# Patient Record
Sex: FEMALE | Race: WHITE | NOT HISPANIC OR LATINO | Employment: OTHER | URBAN - NONMETROPOLITAN AREA
[De-identification: names, ages, dates, MRNs, and addresses within clinical notes are randomized per-mention and may not be internally consistent; named-entity substitution may affect disease eponyms.]

---

## 2021-04-15 PROBLEM — R06.09 DOE (DYSPNEA ON EXERTION): Status: ACTIVE | Noted: 2021-04-15

## 2021-04-15 PROBLEM — G43.909 MIGRAINES: Status: ACTIVE | Noted: 2021-04-15

## 2021-04-15 PROBLEM — I25.10 ATHEROSCLEROSIS OF LEFT ANTERIOR DESCENDING (LAD) ARTERY: Status: ACTIVE | Noted: 2021-04-15

## 2021-04-15 PROBLEM — E78.2 MIXED HYPERLIPIDEMIA: Status: ACTIVE | Noted: 2021-04-15

## 2021-04-15 PROBLEM — Z87.891 FORMER SMOKER: Status: ACTIVE | Noted: 2021-04-15

## 2021-04-15 PROBLEM — K21.9 GERD (GASTROESOPHAGEAL REFLUX DISEASE): Status: ACTIVE | Noted: 2021-04-15

## 2021-04-15 PROBLEM — D64.9 ANEMIA: Status: ACTIVE | Noted: 2021-04-15

## 2021-04-15 PROBLEM — E03.9 HYPOTHYROIDISM: Status: ACTIVE | Noted: 2021-04-15

## 2021-04-15 PROBLEM — Z99.89 OSA ON CPAP: Status: ACTIVE | Noted: 2021-04-15

## 2021-04-15 PROBLEM — D50.9 IRON DEFICIENCY ANEMIA: Status: ACTIVE | Noted: 2021-04-15

## 2021-04-15 PROBLEM — D64.9 ANEMIA: Status: RESOLVED | Noted: 2021-04-15 | Resolved: 2021-04-15

## 2021-04-15 PROBLEM — U07.1 COVID-19: Status: ACTIVE | Noted: 2021-04-15

## 2021-04-15 PROBLEM — G47.33 OSA ON CPAP: Status: ACTIVE | Noted: 2021-04-15

## 2021-04-15 RX ORDER — TRAZODONE HYDROCHLORIDE 150 MG/1
150 TABLET ORAL NIGHTLY
COMMUNITY

## 2021-04-15 RX ORDER — ARIPIPRAZOLE 10 MG/1
10 TABLET ORAL DAILY
COMMUNITY
End: 2022-05-10

## 2021-04-15 RX ORDER — LEVOTHYROXINE SODIUM 0.05 MG/1
50 TABLET ORAL DAILY
COMMUNITY

## 2021-04-15 RX ORDER — QUETIAPINE FUMARATE 300 MG/1
300 TABLET, FILM COATED ORAL NIGHTLY
COMMUNITY

## 2021-04-15 RX ORDER — CHOLECALCIFEROL (VITAMIN D3) 125 MCG
CAPSULE ORAL DAILY
COMMUNITY
End: 2021-04-19

## 2021-04-15 RX ORDER — ASPIRIN 81 MG/1
81 TABLET ORAL DAILY
COMMUNITY

## 2021-04-15 RX ORDER — SODIUM PHOSPHATE,MONO-DIBASIC 19G-7G/118
ENEMA (ML) RECTAL DAILY
COMMUNITY
End: 2021-04-19

## 2021-04-15 RX ORDER — ROSUVASTATIN CALCIUM 20 MG/1
20 TABLET, COATED ORAL DAILY
COMMUNITY

## 2021-04-15 RX ORDER — FUROSEMIDE 20 MG/1
20 TABLET ORAL DAILY
COMMUNITY
End: 2021-04-19

## 2021-04-15 RX ORDER — POTASSIUM CHLORIDE 750 MG/1
10 CAPSULE, EXTENDED RELEASE ORAL DAILY PRN
COMMUNITY

## 2021-04-15 RX ORDER — LANOLIN ALCOHOL/MO/W.PET/CERES
1000 CREAM (GRAM) TOPICAL DAILY
COMMUNITY

## 2021-04-15 RX ORDER — OMEPRAZOLE 20 MG/1
20 CAPSULE, DELAYED RELEASE ORAL DAILY
COMMUNITY

## 2021-04-15 RX ORDER — SUMATRIPTAN 20 MG/1
1 SPRAY NASAL
COMMUNITY

## 2021-04-15 NOTE — PATIENT INSTRUCTIONS
Obesity, Adult  Obesity is the condition of having too much total body fat. Being overweight or obese means that your weight is greater than what is considered healthy for your body size. Obesity is determined by a measurement called BMI. BMI is an estimate of body fat and is calculated from height and weight. For adults, a BMI of 30 or higher is considered obese.  Obesity can lead to other health concerns and major illnesses, including:  · Stroke.  · Coronary artery disease (CAD).  · Type 2 diabetes.  · Some types of cancer, including cancers of the colon, breast, uterus, and gallbladder.  · Osteoarthritis.  · High blood pressure (hypertension).  · High cholesterol.  · Sleep apnea.  · Gallbladder stones.  · Infertility problems.  What are the causes?  Common causes of this condition include:  · Eating daily meals that are high in calories, sugar, and fat.  · Being born with genes that may make you more likely to become obese.  · Having a medical condition that causes obesity, including:  ? Hypothyroidism.  ? Polycystic ovarian syndrome (PCOS).  ? Binge-eating disorder.  ? Cushing syndrome.  · Taking certain medicines, such as steroids, antidepressants, and seizure medicines.  · Not being physically active (sedentary lifestyle).  · Not getting enough sleep.  · Drinking high amounts of sugar-sweetened beverages, such as soft drinks.  What increases the risk?  The following factors may make you more likely to develop this condition:  · Having a family history of obesity.  · Being a woman of  descent.  · Being a man of  descent.  · Living in an area with limited access to:  ? [place], recreation centers, or sidewalks.  ? Healthy food choices, such as grocery stores and farmers' markets.  What are the signs or symptoms?  The main sign of this condition is having too much body fat.  How is this diagnosed?  This condition is diagnosed based on:  · Your BMI. If you are an adult with a BMI of 30 or  higher, you are considered obese.  · Your waist circumference. This measures the distance around your waistline.  · Your skinfold thickness. Your health care provider may gently pinch a fold of your skin and measure it.  You may have other tests to check for underlying conditions.  How is this treated?  Treatment for this condition often includes changing your lifestyle. Treatment may include some or all of the following:  · Dietary changes. This may include developing a healthy meal plan.  · Regular physical activity. This may include activity that causes your heart to beat faster (aerobic exercise) and strength training. Work with your health care provider to design an exercise program that works for you.  · Medicine to help you lose weight if you are unable to lose 1 pound a week after 6 weeks of healthy eating and more physical activity.  · Treating conditions that cause the obesity (underlying conditions).  · Surgery. Surgical options may include gastric banding and gastric bypass. Surgery may be done if:  ? Other treatments have not helped to improve your condition.  ? You have a BMI of 40 or higher.  ? You have life-threatening health problems related to obesity.  Follow these instructions at home:  Eating and drinking    · Follow recommendations from your health care provider about what you eat and drink. Your health care provider may advise you to:  ? Limit fast food, sweets, and processed snack foods.  ? Choose low-fat options, such as low-fat milk instead of whole milk.  ? Eat 5 or more servings of fruits or vegetables every day.  ? Eat at home more often. This gives you more control over what you eat.  ? Choose healthy foods when you eat out.  ? Learn to read food labels. This will help you understand how much food is considered 1 serving.  ? Learn what a healthy serving size is.  ? Keep low-fat snacks available.  ? Limit sugary drinks, such as soda, fruit juice, sweetened iced tea, and flavored  milk.  · Drink enough water to keep your urine pale yellow.  · Do not follow a fad diet. Fad diets can be unhealthy and even dangerous.  Physical activity  · Exercise regularly, as told by your health care provider.  ? Most adults should get up to 150 minutes of moderate-intensity exercise every week.  ? Ask your health care provider what types of exercise are safe for you and how often you should exercise.  · Warm up and stretch before being active.  · Cool down and stretch after being active.  · Rest between periods of activity.  Lifestyle  · Work with your health care provider and a dietitian to set a weight-loss goal that is healthy and reasonable for you.  · Limit your screen time.  · Find ways to reward yourself that do not involve food.  · Do not drink alcohol if:  ? Your health care provider tells you not to drink.  ? You are pregnant, may be pregnant, or are planning to become pregnant.  · If you drink alcohol:  ? Limit how much you use to:  § 0-1 drink a day for women.  § 0-2 drinks a day for men.  ? Be aware of how much alcohol is in your drink. In the [place] one drink equals one 12 oz bottle of beer (355 mL), one 5 oz glass of wine (148 mL), or one 1½ oz glass of hard liquor (44 mL).  General instructions  · Keep a weight-loss journal to keep track of the food you eat and how much exercise you get.  · Take over-the-counter and prescription medicines only as told by your health care provider.  · Take vitamins and supplements only as told by your health care provider.  · Consider joining a support group. Your health care provider may be able to recommend a support group.  · Keep all follow-up visits as told by your health care provider. This is important.  Contact a health care provider if:  · You are unable to meet your weight loss goal after 6 weeks of dietary and lifestyle changes.  Get help right away if you are having:  · Trouble breathing.  · Suicidal thoughts or behaviors.  Summary  · Obesity is the  condition of having too much total body fat.  · Being overweight or obese means that your weight is greater than what is considered healthy for your body size.  · Work with your health care provider and a dietitian to set a weight-loss goal that is healthy and reasonable for you.  · Exercise regularly, as told by your health care provider. Ask your health care provider what types of exercise are safe for you and how often you should exercise.  This information is not intended to replace advice given to you by your health care provider. Make sure you discuss any questions you have with your health care provider.  Document Revised: 08/22/2019 Document Reviewed: 08/22/2019  [employer] Patient Education © 2021 [employer]  MyPlate from [employer]    MyPlate is an outline of a general healthy diet based on the 2010 Dietary Guidelines for Americans, from the U.S. Department of Agriculture (USDA). It sets guidelines for how much food you should eat from each food group based on your age, sex, and level of physical activity.  What are tips for following MyPlate?  To follow MyPlate recommendations:  · Eat a wide variety of fruits and vegetables, grains, and protein foods.  · Serve smaller portions and eat less food throughout the day.  · Limit portion sizes to avoid overeating.  · Enjoy your food.  · Get at least 150 minutes of exercise every week. This is about 30 minutes each day, 5 or more days per week.  It can be difficult to have every meal look like MyPlate. Think about MyPlate as eating guidelines for an entire day, rather than each individual meal.  Fruits and vegetables  · Make half of your plate fruits and vegetables.  · Eat many different colors of fruits and vegetables each day.  · For a 2,000 calorie daily food plan, eat:  ? 2½ cups of vegetables every day.  ? 2 cups of fruit every day.  · 1 cup is equal to:  ? 1 cup raw or cooked vegetables.  ? 1 cup raw fruit.  ? 1 medium-sized orange, apple, or banana.  ? 1 cup  100% fruit or vegetable juice.  ? 2 cups raw leafy greens, such as lettuce, spinach, or kale.  ? ½ cup dried fruit.  Grains  · One fourth of your plate should be grains.  · Make at least half of the grains you eat each day whole grains.  · For a 2,000 calorie daily food plan, eat 6 oz of grains every day.  · 1 oz is equal to:  ? 1 slice bread.  ? 1 cup cereal.  ? ½ cup cooked rice, cereal, or pasta.  Protein  · One fourth of your plate should be protein.  · Eat a wide variety of protein foods, including meat, poultry, fish, eggs, beans, nuts, and tofu.  · For a 2,000 calorie daily food plan, eat 5½ oz of protein every day.  · 1 oz is equal to:  ? 1 oz meat, poultry, or fish.  ? ¼ cup cooked beans.  ? 1 egg.  ? ½ oz nuts or seeds.  ? 1 Tbsp peanut butter.  Dairy  · Drink fat-free or low-fat (1%) milk.  · Eat or drink dairy as a side to meals.  · For a 2,000 calorie daily food plan, eat or drink 3 cups of dairy every day.  · 1 cup is equal to:  ? 1 cup milk, yogurt, cottage cheese, or soy milk (soy beverage).  ? 2 oz processed cheese.  ? 1½ oz natural cheese.  Fats, oils, salt, and sugars  · Only small amounts of oils are recommended.  · Avoid foods that are high in calories and low in nutritional value (empty calories), like foods high in fat or added sugars.  · Choose foods that are low in salt (sodium). Choose foods that have less than 140 milligrams (mg) of sodium per serving.  · Drink water instead of sugary drinks. Drink enough water each day to keep your urine pale yellow.  Where to find support  · Work with your health care provider or a nutrition specialist (dietitian) to develop a customized eating plan that is right for you.  · Download an jd (mobile application) to help you track your daily food intake.  Where to find more information  · Go to ChooseMyPlate.gov for more information.  Summary  · MyPlate is a general guideline for healthy eating from the USDA. It is based on the 2010 Dietary Guidelines for  Americans.  · In general, fruits and vegetables should take up ½ of your plate, grains should take up ¼ of your plate, and protein should take up ¼ of your plate.  This information is not intended to replace advice given to you by your health care provider. Make sure you discuss any questions you have with your health care provider.  Document Revised: 05/21/2020 Document Reviewed: 03/19/2018  [employer] Patient Education © 2021 [employer]

## 2021-04-19 ENCOUNTER — OFFICE VISIT (OUTPATIENT)
Dept: CARDIOLOGY | Facility: CLINIC | Age: 61
End: 2021-04-19

## 2021-04-19 VITALS
WEIGHT: 209.2 LBS | BODY MASS INDEX: 35.71 KG/M2 | OXYGEN SATURATION: 98 % | DIASTOLIC BLOOD PRESSURE: 80 MMHG | HEART RATE: 66 BPM | SYSTOLIC BLOOD PRESSURE: 145 MMHG | HEIGHT: 64 IN

## 2021-04-19 DIAGNOSIS — G47.33 OSA ON CPAP: ICD-10-CM

## 2021-04-19 DIAGNOSIS — Z87.891 FORMER SMOKER: ICD-10-CM

## 2021-04-19 DIAGNOSIS — R06.09 DOE (DYSPNEA ON EXERTION): ICD-10-CM

## 2021-04-19 DIAGNOSIS — R01.1 HEART MURMUR: ICD-10-CM

## 2021-04-19 DIAGNOSIS — Z99.89 OSA ON CPAP: ICD-10-CM

## 2021-04-19 DIAGNOSIS — E78.2 MIXED HYPERLIPIDEMIA: ICD-10-CM

## 2021-04-19 DIAGNOSIS — I25.10 ATHEROSCLEROSIS OF LEFT ANTERIOR DESCENDING (LAD) ARTERY: Primary | ICD-10-CM

## 2021-04-19 PROCEDURE — 93000 ELECTROCARDIOGRAM COMPLETE: CPT | Performed by: INTERNAL MEDICINE

## 2021-04-19 PROCEDURE — 99204 OFFICE O/P NEW MOD 45 MIN: CPT | Performed by: INTERNAL MEDICINE

## 2021-04-19 RX ORDER — IBUPROFEN 800 MG/1
800 TABLET ORAL EVERY 8 HOURS PRN
COMMUNITY

## 2021-04-19 RX ORDER — TORSEMIDE 20 MG/1
TABLET ORAL DAILY PRN
COMMUNITY
Start: 2021-03-22

## 2021-04-19 RX ORDER — FERROUS SULFATE 325(65) MG
TABLET ORAL DAILY
COMMUNITY
Start: 2021-04-13 | End: 2021-06-21

## 2021-04-19 NOTE — PROGRESS NOTES
Subjective   Tonia Chandler is a 60 y.o. female     Chief Complaint   Patient presents with   • Establish Care     Here for eval.    • Coronary Artery Disease   • Shortness of Breath   • Fatigue       PROBLEM LIST:     1. LAD, severe calcification per LDCT scan on 4-   2. MARSHALL  3. Hyperlipidemia  4. QUAN, on CPAP. Followed By Dr. Zambrano  5. Migraines  6. Hypothyroidism  7. Former smoker  8. Iron Def. Anemia    Denies Rheumatic / Scarlet fever  Covid + end of July  Specialty Problems        Cardiology Problems    Atherosclerosis of left anterior descending (LAD) artery        Migraines        Mixed hyperlipidemia                HPI:    Ms. Tonia Chandler is a 60-year-old female patient of [facility] seen for evaluation of coronary disease.    Ms. Chandler is known to our practice but was last evaluated back in 2008.  At that time she presented with palpitations and chest discomfort atypical for angina and had an unremarkable work-up.  She is seen today because low-dose CT scanning demonstrated abundant calcification in the proximal LAD.    Ms. Chandler denies any type of chest discomfort.  She also denies orthopnea, PND, or lower extremity edema.  She no longer palpitates and she has very mild orthostatic lightheadedness but no ziggy dizziness, presyncope, or syncope.  She does not claudicate nor describe other symptoms of peripheral arterial disease, and she has no symptoms of arterial embolic events to include no symptoms of TIA or stroke.  For health reasons Ms. Chandler started dieting in January of this year and has lost 15 pounds.  She routinely functions at class I and II levels of activity without difficulty but states that she has intermittent shortness of breath with high levels of physical activity.  She does not describe a distinct threshold for shortness of breath.  She has no cough or wheeze.                      PRIOR MEDICATIONS    Current Outpatient Medications on File Prior  to Visit   Medication Sig Dispense Refill   • ARIPiprazole (ABILIFY) 10 MG tablet Take 10 mg by mouth Daily.     • Ascorbic Acid (VITAMIN C PO) Take  by mouth Daily.     • aspirin 325 MG tablet Take 325 mg by mouth Daily.     • FeroSul 325 (65 Fe) MG tablet Daily.     • furosemide (LASIX) 20 MG tablet Take 20 mg by mouth Daily.     • glucosamine-chondroitin 500-400 MG capsule capsule Take  by mouth Daily.     • ibuprofen (ADVIL,MOTRIN) 800 MG tablet Take 800 mg by mouth Every 8 (Eight) Hours As Needed for Mild Pain .     • levothyroxine (SYNTHROID, LEVOTHROID) 50 MCG tablet Take 50 mcg by mouth Daily.     • metoprolol tartrate (LOPRESSOR) 25 MG tablet Take 25 mg by mouth 2 (Two) Times a Day.     • Multiple Vitamins-Minerals (ZINC PO) Take  by mouth Daily.     • omeprazole (priLOSEC) 20 MG capsule Take 20 mg by mouth Daily.     • potassium chloride (MICRO-K) 10 MEQ CR capsule Take 10 mEq by mouth Daily As Needed.     • QUEtiapine (SEROquel) 300 MG tablet Take 300 mg by mouth Every Night.     • rosuvastatin (CRESTOR) 20 MG tablet Take 20 mg by mouth Daily.     • SUMAtriptan (IMITREX) 20 MG/ACT nasal spray 1 spray into the nostril(s) as directed by provider Every 2 (Two) Hours As Needed.     • torsemide (DEMADEX) 20 MG tablet Daily As Needed.     • traZODone (DESYREL) 150 MG tablet Take 150 mg by mouth Every Night.     • vitamin B-12 (CYANOCOBALAMIN) 1000 MCG tablet Take 1,000 mcg by mouth Daily.     • VITAMIN E PO Take  by mouth Daily.     • [DISCONTINUED] Calcium Carbonate (CALCIUM 500 PO) Take  by mouth 2 (two) times a day.     • [DISCONTINUED] Cholecalciferol (Vitamin D3) 50 MCG (2000 UT) tablet Take  by mouth Daily.       No current facility-administered medications on file prior to visit.       ALLERGIES:    Hydrocodone    PAST MEDICAL HISTORY:    Past Medical History:   Diagnosis Date   • Atherosclerosis of left anterior descending (LAD) artery    • MARSHALL (dyspnea on exertion)    • Former smoker    • GERD  (gastroesophageal reflux disease)    • Hyperlipidemia    • Hypothyroidism    • Iron deficiency anemia    • Migraines    • QUAN on CPAP     uses cpap       SURGICAL HISTORY:    Past Surgical History:   Procedure Laterality Date   • ENDOSCOPY AND COLONOSCOPY     • ORIF FEMUR FRACTURE       and knee       SOCIAL HISTORY:    Social History     Socioeconomic History   • Marital status:      Spouse name: Not on file   • Number of children: Not on file   • Years of education: Not on file   • Highest education level: Not on file   Tobacco Use   • Smoking status: Former Smoker     Types: Cigarettes   • Smokeless tobacco: Never Used   • Tobacco comment: quit 2.5 yrs. ago, using e-cigs.    Substance and Sexual Activity   • Alcohol use: Yes     Comment: occas. drink   • Drug use: Never       FAMILY HISTORY:    Family History   Problem Relation Age of Onset   • Diabetes Mother    • Hypertension Mother    • Coronary artery disease Mother    • Depression Father        Review of Systems   Constitutional: Positive for fatigue. Negative for chills, diaphoresis, fever and unexpected weight change.   HENT: Negative.    Eyes: Positive for visual disturbance (wears glasses).   Respiratory: Positive for apnea (HX QUAN uses CPAP) and shortness of breath (with stairs/inclines and exertion).         Denies orthopnea/PND   Cardiovascular: Negative.    Gastrointestinal: Positive for constipation (r/t FE PO). Negative for blood in stool (denies melena,hematuria,hemoptysis,hematochezia) and diarrhea.   Endocrine: Positive for heat intolerance. Negative for cold intolerance.   Genitourinary: Negative.    Musculoskeletal: Positive for arthralgias and myalgias.   Skin: Negative.    Allergic/Immunologic: Positive for environmental allergies.   Neurological: Positive for light-headedness (occas. with getting up quickly).   Hematological: Bruises/bleeds easily (bleed).   Psychiatric/Behavioral: Positive for sleep disturbance.       VISIT  "VITALS:  Vitals:    04/19/21 0927   BP: 145/80   BP Location: Left arm   Patient Position: Sitting   Pulse: 66   SpO2: 98%   Weight: 94.9 kg (209 lb 3.2 oz)   Height: 162.6 cm (64\")      /80 (BP Location: Left arm, Patient Position: Sitting)   Pulse 66   Ht 162.6 cm (64\")   Wt 94.9 kg (209 lb 3.2 oz)   SpO2 98%   BMI 35.91 kg/m²     RECENT LABS:    Objective       Physical Exam  Vitals and nursing note reviewed.   Constitutional:       General: She is not in acute distress.     Appearance: She is well-developed.   HENT:      Head: Normocephalic and atraumatic.   Eyes:      Conjunctiva/sclera: Conjunctivae normal.      Pupils: Pupils are equal, round, and reactive to light.      Comments: No ptosis or lid lag  Extra ocular motions intact  ZEKE   Neck:      Vascular: No carotid bruit, hepatojugular reflux or JVD.      Trachea: No tracheal deviation.      Comments: Nl. Carotid upstrokes  Cardiovascular:      Rate and Rhythm: Normal rate and regular rhythm.      Heart sounds: S1 normal and S2 normal. Murmur heard.   Systolic murmur is present.   Gallop present. S4 sounds present.       Comments: 2/6 TR  1-2/6 systolic ejection murmur RUSB  Pulmonary:      Effort: Pulmonary effort is normal.      Breath sounds: Normal breath sounds. No wheezing, rhonchi or rales.      Comments: Nl. Expir. Phase  Nl. Breath sound intensity    Abdominal:      General: Bowel sounds are normal. There is no distension or abdominal bruit.      Palpations: Abdomen is soft. There is no mass.      Tenderness: There is no abdominal tenderness. There is no guarding or rebound.      Comments: No organomegaly   Musculoskeletal:         General: No tenderness or deformity. Normal range of motion.      Cervical back: Normal range of motion and neck supple.      Comments: LLE, no edema, palpable pedal pulses  RLE, no edema, palpable pedal pulses   Skin:     General: Skin is warm and dry.      Coloration: Skin is not pale.      Findings: No " erythema or rash.   Neurological:      Mental Status: She is alert and oriented to person, place, and time.   Psychiatric:         Behavior: Behavior normal.         Thought Content: Thought content normal.         Judgment: Judgment normal.           ECG 12 Lead    Date/Time: 4/19/2021 9:57 AM  Performed by: Kaz Sen MD  Authorized by: Kaz Sen MD   Comparison: not compared with previous ECG   Previous ECG: no previous ECG available  Comments: Sinus bradycardia with sinus arrhythmia.  Intra-atrial conduction delay.              Assessment/Plan   #1.  Coronary artery calcification on CT scan of the chest.  We will schedule stress testing to assess the physiologic significance of atheroma identified at the time of CT scanning.  In the absence of symptoms I would defer the need for invasive evaluation except for high risk findings.    2.  Sleep apnea.  The patient is using CPAP as able.    3.  Exertional dyspnea.  The patient describes mild exertional dyspnea which is been stable over time.  If cardiac work-up is negative would consider pulmonary evaluation.    4.  Dyslipidemia by history.  The patient is tolerating high-dose statin.    5.  History of palpitations.  These are currently quiescent    6.  Valvular heart disease as described on physical exam today.  We will perform echocardiography to reassess valve anatomy and physiology and also to assess LV systolic and diastolic function and pulmonary pressures given the patient's exertional dyspnea.    7.  The patient will follow with on emergency and as instructed, and we will plan to see her in follow-up after testing or on a as needed basis as discussed.   Diagnosis Plan   1. Atherosclerosis of left anterior descending (LAD) artery     2. MARSHALL (dyspnea on exertion)     3. Mixed hyperlipidemia     4. QUAN on CPAP     5. Former smoker         No follow-ups on file.         Tonia Ramesh  reports that she has quit smoking. Her smoking use  included cigarettes. She has never used smokeless tobacco.. I have educated her on the risk of diseases from using tobacco products such as cancer, COPD and heart disease.     I advised her to quit and she is not willing to quit.    I spent 3  minutes counseling the patient.          Patient's Body mass index is 35.91 kg/m². BMI is above normal parameters. Recommendations include: educational material and referral to primary care.       Nancie Foley LPN    Scribed for Dr. Kaz Sen by Nancie Foley LPN April 19, 2021 09:43 EDT         Electronically signed by:            This note is dictated utilizing voice recognition software.  Although this record has been proof read, transcriptional errors may still be present. If questions occur regarding the content of this record please do not hesitate to call our office.

## 2021-06-04 ENCOUNTER — HOSPITAL ENCOUNTER (OUTPATIENT)
Dept: CARDIOLOGY | Facility: HOSPITAL | Age: 61
Discharge: HOME | End: 2021-06-04

## 2021-06-04 DIAGNOSIS — I25.10 ATHEROSCLEROSIS OF LEFT ANTERIOR DESCENDING (LAD) ARTERY: ICD-10-CM

## 2021-06-04 DIAGNOSIS — R01.1 HEART MURMUR: ICD-10-CM

## 2021-06-04 DIAGNOSIS — Z87.891 FORMER SMOKER: ICD-10-CM

## 2021-06-04 DIAGNOSIS — E78.2 MIXED HYPERLIPIDEMIA: ICD-10-CM

## 2021-06-04 DIAGNOSIS — R06.09 DOE (DYSPNEA ON EXERTION): ICD-10-CM

## 2021-06-04 PROCEDURE — A9500 TC99M SESTAMIBI: HCPCS | Performed by: INTERNAL MEDICINE

## 2021-06-04 PROCEDURE — 0 TECHNETIUM SESTAMIBI: Performed by: INTERNAL MEDICINE

## 2021-06-04 PROCEDURE — 78452 HT MUSCLE IMAGE SPECT MULT: CPT

## 2021-06-04 PROCEDURE — 25010000002 REGADENOSON 0.4 MG/5ML SOLUTION: Performed by: INTERNAL MEDICINE

## 2021-06-04 PROCEDURE — 93017 CV STRESS TEST TRACING ONLY: CPT

## 2021-06-04 PROCEDURE — 78452 HT MUSCLE IMAGE SPECT MULT: CPT | Performed by: INTERNAL MEDICINE

## 2021-06-04 PROCEDURE — 93306 TTE W/DOPPLER COMPLETE: CPT | Performed by: INTERNAL MEDICINE

## 2021-06-04 PROCEDURE — 93018 CV STRESS TEST I&R ONLY: CPT | Performed by: INTERNAL MEDICINE

## 2021-06-04 PROCEDURE — 93306 TTE W/DOPPLER COMPLETE: CPT

## 2021-06-04 RX ADMIN — REGADENOSON 0.4 MG: 0.08 INJECTION, SOLUTION INTRAVENOUS at 13:41

## 2021-06-04 RX ADMIN — TECHNETIUM TC 99M SESTAMIBI 1 DOSE: 1 INJECTION INTRAVENOUS at 13:41

## 2021-06-04 RX ADMIN — TECHNETIUM TC 99M SESTAMIBI 1 DOSE: 1 INJECTION INTRAVENOUS at 11:52

## 2021-06-05 LAB
BH CV REST NUCLEAR ISOTOPE DOSE: 10 MCI
BH CV STRESS COMMENTS STAGE 1: NORMAL
BH CV STRESS DOSE REGADENOSON STAGE 1: 0.4
BH CV STRESS DURATION MIN STAGE 1: 0
BH CV STRESS DURATION SEC STAGE 1: 10
BH CV STRESS NUCLEAR ISOTOPE DOSE: 30 MCI
BH CV STRESS PROTOCOL 1: NORMAL
BH CV STRESS RECOVERY BP: NORMAL MMHG
BH CV STRESS RECOVERY HR: 78 BPM
BH CV STRESS STAGE 1: 1
MAXIMAL PREDICTED HEART RATE: 158 BPM
PERCENT MAX PREDICTED HR: 56.96 %
STRESS BASELINE BP: NORMAL MMHG
STRESS BASELINE HR: 57 BPM
STRESS PERCENT HR: 67 %
STRESS POST PEAK BP: NORMAL MMHG
STRESS POST PEAK HR: 90 BPM
STRESS TARGET HR: 134 BPM

## 2021-06-07 ENCOUNTER — TELEPHONE (OUTPATIENT)
Dept: CARDIOLOGY | Facility: CLINIC | Age: 61
End: 2021-06-07

## 2021-06-07 NOTE — TELEPHONE ENCOUNTER
PATIENT IS IN [place]. WILL DISCUSS STRESS TEST RESULTS AT UPCOMING VISIT. PH,LPN          ----- Message from Kaz Sen MD sent at 6/7/2021  1:00 PM EDT -----  Routine f/u.

## 2021-06-13 LAB
BH CV ECHO MEAS - ACS: 1.5 CM
BH CV ECHO MEAS - AO MAX PG (FULL): 3.3 MMHG
BH CV ECHO MEAS - AO MAX PG: 8.3 MMHG
BH CV ECHO MEAS - AO MEAN PG (FULL): 2.2 MMHG
BH CV ECHO MEAS - AO MEAN PG: 4.8 MMHG
BH CV ECHO MEAS - AO ROOT AREA (BSA CORRECTED): 1.4
BH CV ECHO MEAS - AO ROOT AREA: 6.4 CM^2
BH CV ECHO MEAS - AO ROOT DIAM: 2.9 CM
BH CV ECHO MEAS - AO V2 MAX: 144 CM/SEC
BH CV ECHO MEAS - AO V2 MEAN: 105.6 CM/SEC
BH CV ECHO MEAS - AO V2 VTI: 29.9 CM
BH CV ECHO MEAS - AVA(I,A): 2.6 CM^2
BH CV ECHO MEAS - AVA(I,D): 2.6 CM^2
BH CV ECHO MEAS - AVA(V,A): 2.5 CM^2
BH CV ECHO MEAS - AVA(V,D): 2.5 CM^2
BH CV ECHO MEAS - BSA(HAYCOCK): 2.1 M^2
BH CV ECHO MEAS - BSA: 2 M^2
BH CV ECHO MEAS - BZI_BMI: 35.8 KILOGRAMS/M^2
BH CV ECHO MEAS - BZI_METRIC_HEIGHT: 163 CM
BH CV ECHO MEAS - BZI_METRIC_WEIGHT: 95 KG
BH CV ECHO MEAS - EDV(CUBED): 126.4 ML
BH CV ECHO MEAS - EDV(MOD-SP2): 46.9 ML
BH CV ECHO MEAS - EDV(MOD-SP4): 57.7 ML
BH CV ECHO MEAS - EDV(TEICH): 119.3 ML
BH CV ECHO MEAS - EF(CUBED): 68 %
BH CV ECHO MEAS - EF(TEICH): 59.3 %
BH CV ECHO MEAS - ESV(CUBED): 40.4 ML
BH CV ECHO MEAS - ESV(TEICH): 48.5 ML
BH CV ECHO MEAS - FS: 31.6 %
BH CV ECHO MEAS - IVS/LVPW: 1
BH CV ECHO MEAS - IVSD: 0.89 CM
BH CV ECHO MEAS - LA DIMENSION: 3.8 CM
BH CV ECHO MEAS - LA/AO: 1.3
BH CV ECHO MEAS - LV DIASTOLIC VOL/BSA (35-75): 28.9 ML/M^2
BH CV ECHO MEAS - LV IVRT: 0.09 SEC
BH CV ECHO MEAS - LV MASS(C)D: 155.7 GRAMS
BH CV ECHO MEAS - LV MASS(C)DI: 77.9 GRAMS/M^2
BH CV ECHO MEAS - LV MAX PG: 5 MMHG
BH CV ECHO MEAS - LV MEAN PG: 2.6 MMHG
BH CV ECHO MEAS - LV V1 MAX: 112 CM/SEC
BH CV ECHO MEAS - LV V1 MEAN: 77 CM/SEC
BH CV ECHO MEAS - LV V1 VTI: 23.6 CM
BH CV ECHO MEAS - LVIDD: 5 CM
BH CV ECHO MEAS - LVIDS: 3.4 CM
BH CV ECHO MEAS - LVOT AREA (M): 3.3 CM^2
BH CV ECHO MEAS - LVOT AREA: 3.2 CM^2
BH CV ECHO MEAS - LVOT DIAM: 2 CM
BH CV ECHO MEAS - LVPWD: 0.88 CM
BH CV ECHO MEAS - MR MAX PG: 15.1 MMHG
BH CV ECHO MEAS - MR MAX VEL: 193.7 CM/SEC
BH CV ECHO MEAS - MV A MAX VEL: 64.8 CM/SEC
BH CV ECHO MEAS - MV DEC TIME: 0.21 SEC
BH CV ECHO MEAS - MV E MAX VEL: 93.3 CM/SEC
BH CV ECHO MEAS - MV E/A: 1.4
BH CV ECHO MEAS - MV MAX PG: 3.4 MMHG
BH CV ECHO MEAS - MV MEAN PG: 0.99 MMHG
BH CV ECHO MEAS - MV V2 MAX: 92.4 CM/SEC
BH CV ECHO MEAS - MV V2 MEAN: 43.8 CM/SEC
BH CV ECHO MEAS - MV V2 VTI: 25.6 CM
BH CV ECHO MEAS - MVA(VTI): 3 CM^2
BH CV ECHO MEAS - PA ACC TIME: 0.13 SEC
BH CV ECHO MEAS - PA MAX PG: 2.2 MMHG
BH CV ECHO MEAS - PA MEAN PG: 1.5 MMHG
BH CV ECHO MEAS - PA PR(ACCEL): 22.3 MMHG
BH CV ECHO MEAS - PA V2 MAX: 74.6 CM/SEC
BH CV ECHO MEAS - PA V2 MEAN: 59.1 CM/SEC
BH CV ECHO MEAS - PA V2 VTI: 17 CM
BH CV ECHO MEAS - PULM A REVS DUR: 0.12 SEC
BH CV ECHO MEAS - PULM A REVS VEL: 26.3 CM/SEC
BH CV ECHO MEAS - PULM DIAS VEL: 70 CM/SEC
BH CV ECHO MEAS - PULM S/D: 1.1
BH CV ECHO MEAS - PULM SYS VEL: 77.3 CM/SEC
BH CV ECHO MEAS - RAP SYSTOLE: 10 MMHG
BH CV ECHO MEAS - RVDD: 3.7 CM
BH CV ECHO MEAS - RVSP: 14.5 MMHG
BH CV ECHO MEAS - SI(AO): 95.9 ML/M^2
BH CV ECHO MEAS - SI(CUBED): 43 ML/M^2
BH CV ECHO MEAS - SI(LVOT): 38.3 ML/M^2
BH CV ECHO MEAS - SI(TEICH): 35.4 ML/M^2
BH CV ECHO MEAS - SV(AO): 191.6 ML
BH CV ECHO MEAS - SV(CUBED): 86 ML
BH CV ECHO MEAS - SV(LVOT): 76.5 ML
BH CV ECHO MEAS - SV(TEICH): 70.7 ML
BH CV ECHO MEAS - TR MAX VEL: 106 CM/SEC

## 2021-06-14 ENCOUNTER — TELEPHONE (OUTPATIENT)
Dept: CARDIOLOGY | Facility: CLINIC | Age: 61
End: 2021-06-14

## 2021-06-21 ENCOUNTER — OFFICE VISIT (OUTPATIENT)
Dept: CARDIOLOGY | Facility: CLINIC | Age: 61
End: 2021-06-21

## 2021-06-21 VITALS
SYSTOLIC BLOOD PRESSURE: 151 MMHG | OXYGEN SATURATION: 98 % | HEART RATE: 61 BPM | HEIGHT: 64 IN | DIASTOLIC BLOOD PRESSURE: 88 MMHG | BODY MASS INDEX: 35.55 KG/M2 | WEIGHT: 208.2 LBS

## 2021-06-21 DIAGNOSIS — R06.02 SHORTNESS OF BREATH: ICD-10-CM

## 2021-06-21 DIAGNOSIS — R53.83 FATIGUE, UNSPECIFIED TYPE: ICD-10-CM

## 2021-06-21 DIAGNOSIS — I25.10 ATHEROSCLEROSIS OF LEFT ANTERIOR DESCENDING (LAD) ARTERY: Primary | ICD-10-CM

## 2021-06-21 DIAGNOSIS — I51.89 GRADE II DIASTOLIC DYSFUNCTION: ICD-10-CM

## 2021-06-21 DIAGNOSIS — I10 ESSENTIAL HYPERTENSION: ICD-10-CM

## 2021-06-21 PROCEDURE — 99214 OFFICE O/P EST MOD 30 MIN: CPT | Performed by: NURSE PRACTITIONER

## 2021-06-21 NOTE — PATIENT INSTRUCTIONS
Hypertension, Adult  Hypertension is another name for high blood pressure. High blood pressure forces your heart to work harder to pump blood. This can cause problems over time.  There are two numbers in a blood pressure reading. There is a top number (systolic) over a bottom number (diastolic). It is best to have a blood pressure that is below 120/80. Healthy choices can help lower your blood pressure, or you may need medicine to help lower it.  What are the causes?  The cause of this condition is not known. Some conditions may be related to high blood pressure.  What increases the risk?  · Smoking.  · Having type 2 diabetes mellitus, high cholesterol, or both.  · Not getting enough exercise or physical activity.  · Being overweight.  · Having too much fat, sugar, calories, or salt (sodium) in your diet.  · Drinking too much alcohol.  · Having long-term (chronic) kidney disease.  · Having a family history of high blood pressure.  · Age. Risk increases with age.  · Race. You may be at higher risk if you are .  · Gender. Men are at higher risk than women before age 45. After age 65, women are at higher risk than men.  · Having obstructive sleep apnea.  · Stress.  What are the signs or symptoms?  · High blood pressure may not cause symptoms. Very high blood pressure (hypertensive crisis) may cause:  ? Headache.  ? Feelings of worry or nervousness (anxiety).  ? Shortness of breath.  ? Nosebleed.  ? A feeling of being sick to your stomach (nausea).  ? Throwing up (vomiting).  ? Changes in how you see.  ? Very bad chest pain.  ? Seizures.  How is this treated?  · This condition is treated by making healthy lifestyle changes, such as:  ? Eating healthy foods.  ? Exercising more.  ? Drinking less alcohol.  · Your health care provider may prescribe medicine if lifestyle changes are not enough to get your blood pressure under control, and if:  ? Your top number is above 130.  ? Your bottom number is above  80.  · Your personal target blood pressure may vary.  Follow these instructions at home:  Eating and drinking    · If told, follow the DASH eating plan. To follow this plan:  ? Fill one half of your plate at each meal with fruits and vegetables.  ? Fill one fourth of your plate at each meal with whole grains. Whole grains include whole-wheat pasta, brown rice, and whole-grain bread.  ? Eat or drink low-fat dairy products, such as skim milk or low-fat yogurt.  ? Fill one fourth of your plate at each meal with low-fat (lean) proteins. Low-fat proteins include fish, chicken without skin, eggs, beans, and tofu.  ? Avoid fatty meat, cured and processed meat, or chicken with skin.  ? Avoid pre-made or processed food.  · Eat less than 1,500 mg of salt each day.  · Do not drink alcohol if:  ? Your doctor tells you not to drink.  ? You are pregnant, may be pregnant, or are planning to become pregnant.  · If you drink alcohol:  ? Limit how much you use to:  § 0-1 drink a day for women.  § 0-2 drinks a day for men.  ? Be aware of how much alcohol is in your drink. In the [place] one drink equals one 12 oz bottle of beer (355 mL), one 5 oz glass of wine (148 mL), or one 1½ oz glass of hard liquor (44 mL).  Lifestyle    · Work with your doctor to stay at a healthy weight or to lose weight. Ask your doctor what the best weight is for you.  · Get at least 30 minutes of exercise most days of the week. This may include walking, swimming, or biking.  · Get at least 30 minutes of exercise that strengthens your muscles (resistance exercise) at least 3 days a week. This may include lifting weights or doing Pilates.  · Do not use any products that contain nicotine or tobacco, such as cigarettes, e-cigarettes, and chewing tobacco. If you need help quitting, ask your doctor.  · Check your blood pressure at home as told by your doctor.  · Keep all follow-up visits as told by your doctor. This is important.  Medicines  · Take over-the-counter  and prescription medicines only as told by your doctor. Follow directions carefully.  · Do not skip doses of blood pressure medicine. The medicine does not work as well if you skip doses. Skipping doses also puts you at risk for problems.  · Ask your doctor about side effects or reactions to medicines that you should watch for.  Contact a doctor if you:  · Think you are having a reaction to the medicine you are taking.  · Have headaches that keep coming back (recurring).  · Feel dizzy.  · Have swelling in your ankles.  · Have trouble with your vision.  Get help right away if you:  · Get a very bad headache.  · Start to feel mixed up (confused).  · Feel weak or numb.  · Feel faint.  · Have very bad pain in your:  ? Chest.  ? Belly (abdomen).  · Throw up more than once.  · Have trouble breathing.  Summary  · Hypertension is another name for high blood pressure.  · High blood pressure forces your heart to work harder to pump blood.  · For most people, a normal blood pressure is less than 120/80.  · Making healthy choices can help lower blood pressure. If your blood pressure does not get lower with healthy choices, you may need to take medicine.  This information is not intended to replace advice given to you by your health care provider. Make sure you discuss any questions you have with your health care provider.  Document Revised: 08/28/2019 Document Reviewed: 08/28/2019  [employer] Patient Education © 2021 [employer]      Acute Coronary Syndrome  Acute coronary syndrome (ACS) is a serious problem in which there is suddenly not enough blood and oxygen reaching the heart. ACS can result in chest pain or a heart attack.  This condition is a medical emergency. If you have any symptoms of this condition, get help right away.  What are the causes?  This condition may be caused by:  · A buildup of fat and cholesterol inside the arteries (atherosclerosis). This is the most common cause. The buildup (plaque) can cause blood  vessels in the heart (coronary arteries) to become narrow or blocked, which reduces blood flow to the heart. Plaque can also break off and lead to a clot, which can block an artery and cause a heart attack or stroke.  · Sudden tightening of the muscles around the coronary arteries (coronary spasm).  · Tearing of a coronary artery (spontaneous coronary artery dissection).  · Very low blood pressure (hypotension).  · An abnormal heartbeat (arrhythmia).  · Other medical conditions that cause a decrease of oxygen to the heart, such as anemiaorrespiratory failure.  · Using cocaine or methamphetamine.  What increases the risk?  The following factors may make you more likely to develop this condition:  · Age. The risk for ACS increases as you get older.  · History of chest pain, heart attack, peripheral artery disease, or stroke.  · Having taken chemotherapy or immune-suppressing medicines.  · Being male.  · Family history of chest pain, heart disease, or stroke.  · Smoking.  · Not exercising enough.  · Being overweight.  · High cholesterol.  · High blood pressure (hypertension).  · Diabetes.  · Excessive alcohol use.  What are the signs or symptoms?  Common symptoms of this condition include:  · Chest pain. The pain may last a long time, or it may stop and come back (recur). It may feel like:  ? Crushing or squeezing.  ? Tightness, pressure, fullness, or heaviness.  · Arm, neck, jaw, or back pain.  · Heartburn or indigestion.  · Shortness of breath.  · Nausea.  · Sudden cold sweats.  · Light-headedness.  · Dizziness or passing out.  · Tiredness (fatigue).  Sometimes there are no symptoms.  How is this diagnosed?  This condition may be diagnosed based on:  · Your medical history and symptoms.  · Imaging tests, such as:  ? An electrocardiogram (ECG). This measures the heart's electrical activity.  ? X-rays.  ? CT scan.  ? A coronary angiogram. For this test, dye is injected into the heart arteries and then X-rays are  taken.  ? Myocardial perfusion imaging. This test shows how well blood flows through your heart muscle.  · Blood tests. These may be repeated at certain time intervals.  · Exercise stress testing.  · Echocardiogram. This is a test that uses sound waves to produce detailed images of the heart.  How is this treated?  Treatment for this condition may include:  · Oxygen therapy.  · Medicines, such as:  ? Antiplatelet medicines and blood-thinning medicines, such as aspirin. These help prevent blood clots.  ? Medicine that dissolves any blood clots (fibrinolytic therapy).  ? Blood pressure medicines.  ? Nitroglycerin. This helps widen blood vessels to improve blood flow.  ? Pain medicine.  ? Cholesterol-lowering medicine.  · Surgery, such as:  ? Coronary angioplasty with stent placement. This involves placing a small piece of metal that looks like mesh or a spring into a narrow coronary artery. This widens the artery and keeps it open.  ? Coronary artery bypass surgery. This involves taking a section of a blood vessel from a different part of your body and placing it on the blocked coronary artery to allow blood to flow around the blockage.  · Cardiac rehabilitation. This is a program that includes exercise training, education, and counseling to help you recover.  Follow these instructions at home:  Eating and drinking  · Eat a heart-healthy diet that includes whole grains, fruits and vegetables, lean proteins, and low-fat or nonfat dairy products.  · Limit how much salt (sodium) you eat as told by your health care provider. Follow instructions from your health care provider about any other eating or drinking restrictions, such as limiting foods that are high in fat and processed sugars.  · Use healthy cooking methods such as roasting, grilling, broiling, baking, poaching, steaming, or stir-frying.  · Work with a dietitian to follow a heart-healthy eating plan.  Medicines  · Take over-the-counter and prescription  medicines only as told by your health care provider.  · Do not take these medicines unless your health care provider approves:  ? Vitamin supplements that contain vitamin A or vitamin E.  ? NSAIDs, such as ibuprofen, naproxen, or celecoxib.  ? Hormone replacement therapy that contains estrogen.  · If you are taking blood thinners:  ? Talk with your health care provider before you take any medicines that contain aspirin or NSAIDs. These medicines increase your risk for dangerous bleeding.  ? Take your medicine exactly as told, at the same time every day.  ? Avoid activities that could cause injury or bruising, and follow instructions about how to prevent falls.  ? Wear a medical alert bracelet, and carry a card that lists what medicines you take.  Activity  · Follow your cardiac rehabilitation program. Do exercises as told by your physical therapist.  · Ask your health care provider what activities and exercises are safe for you. Follow his or her instructions about lifting, driving, or climbing stairs.  Lifestyle  · Do not use any products that contain nicotine or tobacco, such as cigarettes, e-cigarettes, and chewing tobacco. If you need help quitting, ask your health care provider.  · Do not drink alcohol if your health care provider tells you not to drink.  · If you drink alcohol:  ? Limit how much you have to 0-1 drink a day.  ? Be aware of how much alcohol is in your drink. In the [place] one drink equals one 12 oz bottle of beer (355 mL), one 5 oz glass of wine (148 mL), or one 1½ oz glass of hard liquor (44 mL).  · Maintain a healthy weight. If you need to lose weight, work with your health care provider to do so safely.  General instructions  · Tell all the health care providers who provide care for you about your heart condition, including your dentist. This may affect the medicines or treatment you receive.  · Manage any other health conditions you have, such as hypertension or diabetes. These conditions  affect your heart.  · Pay attention to your mental health. You may be at higher risk for depression.  ? Find ways to manage stress.  ? Talk to your health care provider about depression screening and treatment.  · Keep your vaccinations up to date.  ? Get the flu shot (influenza vaccine) every year.  ? Get the pneumococcal vaccine if you are age 65 or older.  · If directed, monitor your blood pressure at home.  · Keep all follow-up visits as told by your health care provider. This is important.  Contact a health care provider if you:  · Feel overwhelmed or sad.  · Have trouble doing your daily activities.  Get help right away if you:  · Have pain in your chest, neck, arm, jaw, stomach, or back that recurs, and:  ? It lasts for more than a few minutes.  ? It is not relieved by taking the medicineyour health care provider prescribed.  · Have unexplained:  ? Heavy sweating.  ? Heartburn or indigestion.  ? Nausea or vomiting.  ? Shortness of breath.  ? Difficulty breathing.  ? Fatigue.  ? Nervousness or anxiety.  ? Weakness.  ? Diarrhea.  ? Dark stools or blood in your stool.  · Have sudden light-headedness or dizziness.  · Have blood pressure that is higher than 180/120.  · Faint.  · Have thoughts about hurting yourself.  These symptoms may represent a serious problem that is an emergency. Do not wait to see if the symptoms will go away. Get medical help right away. Call your local emergency services (911 in the [place] Do not drive yourself to the hospital.   Summary  · Acute coronary syndrome (ACS) is when there is not enough blood and oxygen being supplied to the heart. ACS can result in chest pain or a heart attack.  · Acute coronary syndrome is a medical emergency. If you have any symptoms of this condition, get help right away.  · Treatment includes medicines and procedures to open the blocked arteries and restore blood flow.  This information is not intended to replace advice given to you by your health care  provider. Make sure you discuss any questions you have with your health care provider.  Document Revised: 05/20/2020 Document Reviewed: 12/30/2019  [employer] Patient Education © 2021 [employer]

## 2021-06-21 NOTE — PROGRESS NOTES
Subjective     Tonia Chandler is a 60 y.o. female who presents to day for Shortness of Breath (presents for stress and echo f/u) and Hypertension.    CHIEF COMPLIANT  Chief Complaint   Patient presents with   • Shortness of Breath     presents for stress and echo f/u   • Hypertension       Active Problems:  Problem List Items Addressed This Visit        Cardiac and Vasculature    Atherosclerosis of left anterior descending (LAD) artery - Primary      Other Visit Diagnoses     Shortness of breath        Essential hypertension        Fatigue, unspecified type        Grade II diastolic dysfunction          PROBLEM LIST:      1. LAD, severe calcification per LDCT scan on 4-   2. MARSHALL  3. Hyperlipidemia  4. QUAN, on CPAP. Followed By Dr. Zambrano  5. Migraines  6. Hypothyroidism  7. Former smoker  8. Iron Def. Anemia       HPI  HPI  Ms. Chandler is a 60-year-old female patient who is being followed up today for shortness of breath, fatigue, hypertension and stress and echocardiogram results.  Patient also has had a little does CT of her chest that showed severe calcification of the left anterior descending.  She does report some level of dyspnea that is occurring with exertion such as climbing stairs.  She denies dyspnea at rest.  She does have continued fatigue where she tires easily in which she previously related to Covid.  We did have patient undergo stress test and echocardiogram due to the abnormal CTA, shortness of breath and fatigue.  The stress test was negative for ischemia but did have an elevated transischemic dilation ratio 1.24.  We also did an echocardiogram that showed concentric left ventricular hypertrophy with grade 2 diastolic dysfunction mild to moderate left atrial enlargement with trivial AI, MR, and TR.  She had a ejection fraction that was estimated to be greater than 50%.  Patient does have chronic arterial hypertension which her blood pressure is elevated today at 151/88.  She says this  is not my blood pressure that is usually normal at home.  She says at home her blood pressure is 120 over 80s.  She is currently on 25 mg of metoprolol twice daily for her hypertension.  She is also on aspirin 325 and Crestor 20.  She denies any chest pain, lower extremity edema, palpitations, dizziness, lightheadedness, headaches, syncope, PND, orthopnea, or other neurological problems.  We did review her stress test and echocardiogram in detail she denied any questions at this time.  She did request copies for she did discuss with her .  PRIOR MEDS  Current Outpatient Medications on File Prior to Visit   Medication Sig Dispense Refill   • ARIPiprazole (ABILIFY) 10 MG tablet Take 10 mg by mouth Daily.     • Ascorbic Acid (VITAMIN C PO) Take  by mouth Daily.     • aspirin 325 MG tablet Take 325 mg by mouth Daily.     • ibuprofen (ADVIL,MOTRIN) 800 MG tablet Take 800 mg by mouth Every 8 (Eight) Hours As Needed for Mild Pain .     • levothyroxine (SYNTHROID, LEVOTHROID) 50 MCG tablet Take 50 mcg by mouth Daily.     • metoprolol tartrate (LOPRESSOR) 25 MG tablet Take 25 mg by mouth 2 (Two) Times a Day.     • Multiple Vitamins-Minerals (ZINC PO) Take  by mouth Daily.     • omeprazole (priLOSEC) 20 MG capsule Take 20 mg by mouth Daily.     • potassium chloride (MICRO-K) 10 MEQ CR capsule Take 10 mEq by mouth Daily As Needed.     • QUEtiapine (SEROquel) 300 MG tablet Take 300 mg by mouth Every Night.     • rosuvastatin (CRESTOR) 20 MG tablet Take 20 mg by mouth Daily.     • SUMAtriptan (IMITREX) 20 MG/ACT nasal spray 1 spray into the nostril(s) as directed by provider Every 2 (Two) Hours As Needed.     • torsemide (DEMADEX) 20 MG tablet Daily As Needed.     • traZODone (DESYREL) 150 MG tablet Take 150 mg by mouth Every Night.     • vitamin B-12 (CYANOCOBALAMIN) 1000 MCG tablet Take 1,000 mcg by mouth Daily.     • [DISCONTINUED] FeroSul 325 (65 Fe) MG tablet Daily.     • [DISCONTINUED] VITAMIN E PO Take  by mouth  Daily.       No current facility-administered medications on file prior to visit.       ALLERGIES  Hydrocodone    HISTORY  Past Medical History:   Diagnosis Date   • Atherosclerosis of left anterior descending (LAD) artery    • Cancer (CMS/HCC)     melanoma   • MARSHALL (dyspnea on exertion)    • Emphysema of lung (CMS/HCC)     per LDCT scan 4-   • Former smoker    • GERD (gastroesophageal reflux disease)    • Hyperlipidemia    • Hypothyroidism    • Iron deficiency anemia    • Migraines    • QUAN on CPAP     uses cpap   • Tachycardia        Social History     Socioeconomic History   • Marital status:      Spouse name: Not on file   • Number of children: Not on file   • Years of education: Not on file   • Highest education level: Not on file   Tobacco Use   • Smoking status: Former Smoker     Types: Cigarettes   • Smokeless tobacco: Never Used   • Tobacco comment: quit 2.5 yrs. ago, using e-cigs.    Substance and Sexual Activity   • Alcohol use: Yes     Comment: occas. drink   • Drug use: Never       Family History   Problem Relation Age of Onset   • Diabetes Mother    • Hypertension Mother    • Coronary artery disease Mother    • Depression Father        Review of Systems   Constitutional: Positive for fatigue (tires easilt, Covid July 2020). Negative for chills, diaphoresis and fever.   HENT: Negative.  Negative for nosebleeds.    Eyes: Positive for visual disturbance.   Respiratory: Positive for apnea (cpap) and shortness of breath ( on exertion and climbing stairs). Negative for cough, chest tightness and wheezing.    Cardiovascular: Negative.  Negative for chest pain, palpitations and leg swelling.   Gastrointestinal: Negative.  Negative for blood in stool.   Endocrine: Negative.    Genitourinary: Negative.  Negative for hematuria.   Musculoskeletal: Negative.    Skin: Negative.    Allergic/Immunologic: Negative.    Neurological: Negative.  Negative for dizziness, syncope, weakness, light-headedness,  "numbness and headaches.   Hematological: Bruises/bleeds easily.   Psychiatric/Behavioral: Negative.  Negative for sleep disturbance.       Objective     VITALS: /88 (BP Location: Right arm, Patient Position: Sitting)   Pulse 61   Ht 162.6 cm (64.02\")   Wt 94.4 kg (208 lb 3.2 oz)   SpO2 98%   BMI 35.72 kg/m²     LABS:   Lab Results (most recent)     None          IMAGING:   No Images in the past 120 days found..    EXAM:  Physical Exam  Vitals and nursing note reviewed.   Constitutional:       Appearance: She is well-developed.   HENT:      Head: Normocephalic.   Eyes:      Pupils: Pupils are equal, round, and reactive to light.   Neck:      Thyroid: No thyroid mass.      Vascular: No carotid bruit or JVD.      Trachea: Trachea and phonation normal.   Cardiovascular:      Rate and Rhythm: Normal rate and regular rhythm.      Pulses:           Radial pulses are 2+ on the right side and 2+ on the left side.        Posterior tibial pulses are 2+ on the right side and 2+ on the left side.      Heart sounds: Murmur heard.   No friction rub. No gallop.    Pulmonary:      Effort: Pulmonary effort is normal. No respiratory distress.      Breath sounds: Normal breath sounds. No wheezing or rales.   Abdominal:      General: Bowel sounds are normal.      Palpations: Abdomen is soft.   Musculoskeletal:         General: Normal range of motion.      Cervical back: Neck supple.   Skin:     General: Skin is warm and dry.      Capillary Refill: Capillary refill takes less than 2 seconds.      Findings: No rash.   Neurological:      Mental Status: She is alert and oriented to person, place, and time.   Psychiatric:         Speech: Speech normal.         Behavior: Behavior normal.         Thought Content: Thought content normal.         Judgment: Judgment normal.         Procedure   Procedures       Assessment/Plan    Diagnosis Plan   1. Atherosclerosis of left anterior descending (LAD) artery     2. Shortness of breath     "   3. Essential hypertension     4. Fatigue, unspecified type     5. Grade II diastolic dysfunction     1.  Patient did have an abnormal CTA of the chest that identified advanced atherosclerotic changes of the left anterior descending.  She did undergo a stress test that did not identify any ischemia however she did have an elevated transischemic dilation ratio.  She has dyspnea on exertion which is improved.  We did discuss the elevated transischemic dilation ratio of 1.24.  She is concerned due to her fatigue, shortness of breath, and her mother's history of heart disease.  She wishes to get copies of her test and discussed with her .  2.  Patient shortness of breath is stable and nonprogressive.  We will continue to monitor this time.  3.  Patient's blood pressure is elevated today 151/88.  She says that her blood pressure is usually within normal range.  She says most commonly is 120s over 80s.  She will continue to monitor her blood pressure on a routine basis report any significant highs or lows.  4.  Patient does have grade 2 diastolic dysfunction as she is on as needed torsemide.  We did discuss taking torsemide on a daily basis versus as needed.  Patient advised to weigh themself daily and if there is a weight gain of 3 to 5 pounds overnight or 6 to 8 pounds in a week to take the torsemide  5.  Informed of signs and symptoms of ACS and advised to seek emergent treatment for any new worsening symptoms.  Patient also advised sooner follow-up as needed.  Also advised to follow-up with family doctor as needed  This note is dictated utilizing voice recognition software.  Although this record has been proof read, transcriptional errors may still be present. If questions occur regarding the content of this record please do not hesitate to call our office.  I have reviewed and confirmed the accuracy of the ROS as documented by the MA/LPN/ALAN Thompson    No follow-ups on file.    Diagnoses and all  orders for this visit:    1. Atherosclerosis of left anterior descending (LAD) artery (Primary)    2. Shortness of breath    3. Essential hypertension    4. Fatigue, unspecified type    5. Grade II diastolic dysfunction                 MEDS ORDERED DURING VISIT:  No orders of the defined types were placed in this encounter.          This document has been electronically signed by Brian Rivera Jr., APRN  June 21, 2021 12:15 EDT

## 2021-09-21 ENCOUNTER — OFFICE VISIT (OUTPATIENT)
Dept: CARDIOLOGY | Facility: CLINIC | Age: 61
End: 2021-09-21

## 2021-09-21 VITALS
HEIGHT: 64 IN | WEIGHT: 211 LBS | DIASTOLIC BLOOD PRESSURE: 86 MMHG | SYSTOLIC BLOOD PRESSURE: 126 MMHG | HEART RATE: 52 BPM | BODY MASS INDEX: 36.02 KG/M2 | OXYGEN SATURATION: 97 %

## 2021-09-21 DIAGNOSIS — I25.10 ATHEROSCLEROSIS OF LEFT ANTERIOR DESCENDING (LAD) ARTERY: ICD-10-CM

## 2021-09-21 DIAGNOSIS — E78.2 MIXED HYPERLIPIDEMIA: Primary | ICD-10-CM

## 2021-09-21 DIAGNOSIS — Z87.891 FORMER SMOKER: ICD-10-CM

## 2021-09-21 DIAGNOSIS — G47.33 OSA ON CPAP: ICD-10-CM

## 2021-09-21 DIAGNOSIS — R06.09 DOE (DYSPNEA ON EXERTION): ICD-10-CM

## 2021-09-21 DIAGNOSIS — Z99.89 OSA ON CPAP: ICD-10-CM

## 2021-09-21 PROCEDURE — 99213 OFFICE O/P EST LOW 20 MIN: CPT | Performed by: INTERNAL MEDICINE

## 2021-09-21 RX ORDER — EZETIMIBE 10 MG/1
10 TABLET ORAL DAILY
Qty: 90 TABLET | Refills: 3 | Status: SHIPPED | OUTPATIENT
Start: 2021-09-21 | End: 2022-08-08

## 2021-09-21 NOTE — PROGRESS NOTES
Subjective   Tonia Chandler is a 60 y.o. female     Chief Complaint   Patient presents with   • Follow-up     Here for 3 mo. f/u   • Coronary Artery Disease   • Hyperlipidemia   • Sleep Apnea       PROBLEM LIST:     1. LAD, severe calcification per LDCT scan on 4-   1.1 Stress test, 6-4-2021, no ischemia  2. MARSHALL  3. Hyperlipidemia  4. QUAN, on CPAP. Followed By Dr. Zambrano  5. Migraines  6. Hypothyroidism  7. Former smoker  8. Iron Def. Anemia  9. Echo, 6-4-2021, EF 50%, grade 2 DD, trivial AI/MR/TR  10. Currently uses E-Cig. Quit smoking 3 yrs. ago           Cardiology Problems    Atherosclerosis of left anterior descending (LAD) artery        Migraines        Mixed hyperlipidemia                HPI:  Ms. Chandler returns for follow-up    She describes right shoulder discomfort.  This is well localized, sharp, and is exacerbated by upper extremity activity but not by walking or other aerobic activities.  She denies orthopnea, PND, or change in lower extremity edema.  She has minimal palpitations and she describes orthostatic lightheadedness but no presyncope or syncope.    Stress test demonstrated no evidence of ischemia with preserved LV systolic function and an elevated transient ischemic dilation ratio.  We discussed the significance (or lack thereof) of this ratio in the setting of no evidence of ischemia, single-vessel disease by CT, and preserved LV systolic function.  The patient describes symptoms which are clearly nonanginal.  Therefore, I do not feel that further evaluation or risk ratification from the standpoint of ischemia is indicated at this time.    Data review demonstrates a recent fasting lipid profile with an LDL of 100.                      PRIOR MEDICATIONS    Current Outpatient Medications on File Prior to Visit   Medication Sig Dispense Refill   • ARIPiprazole (ABILIFY) 10 MG tablet Take 10 mg by mouth Daily.     • Ascorbic Acid (VITAMIN C PO) Take  by mouth Daily.     • aspirin 325  MG tablet Take 325 mg by mouth Daily.     • ibuprofen (ADVIL,MOTRIN) 800 MG tablet Take 800 mg by mouth Every 8 (Eight) Hours As Needed for Mild Pain .     • levothyroxine (SYNTHROID, LEVOTHROID) 50 MCG tablet Take 50 mcg by mouth Daily.     • metoprolol tartrate (LOPRESSOR) 25 MG tablet Take 25 mg by mouth 2 (Two) Times a Day.     • Multiple Vitamins-Minerals (ZINC PO) Take  by mouth Daily.     • omeprazole (priLOSEC) 20 MG capsule Take 20 mg by mouth Daily.     • potassium chloride (MICRO-K) 10 MEQ CR capsule Take 10 mEq by mouth Daily As Needed.     • QUEtiapine (SEROquel) 300 MG tablet Take 300 mg by mouth Every Night.     • rosuvastatin (CRESTOR) 20 MG tablet Take 20 mg by mouth Daily.     • SUMAtriptan (IMITREX) 20 MG/ACT nasal spray 1 spray into the nostril(s) as directed by provider Every 2 (Two) Hours As Needed.     • torsemide (DEMADEX) 20 MG tablet Daily As Needed.     • traZODone (DESYREL) 150 MG tablet Take 150 mg by mouth Every Night.     • vitamin B-12 (CYANOCOBALAMIN) 1000 MCG tablet Take 1,000 mcg by mouth Daily.       No current facility-administered medications on file prior to visit.       ALLERGIES:    Hydrocodone    PAST MEDICAL HISTORY:    Past Medical History:   Diagnosis Date   • Atherosclerosis of left anterior descending (LAD) artery    • Cancer (CMS/HCC)     melanoma   • MARSHALL (dyspnea on exertion)    • Emphysema of lung (CMS/HCC)     per LDCT scan 2021   • Former smoker    • GERD (gastroesophageal reflux disease)    • Hyperlipidemia    • Hypothyroidism    • Iron deficiency anemia    • Migraines    • QUAN on CPAP     uses cpap   • Tachycardia        SURGICAL HISTORY:    Past Surgical History:   Procedure Laterality Date   • APPENDECTOMY     •  SECTION     • ENDOSCOPY AND COLONOSCOPY     • ORIF FEMUR FRACTURE       and knee   • TONSILLECTOMY AND ADENOIDECTOMY         SOCIAL HISTORY:    Social History     Socioeconomic History   • Marital status:      Spouse name:  "Not on file   • Number of children: Not on file   • Years of education: Not on file   • Highest education level: Not on file   Tobacco Use   • Smoking status: Former Smoker     Types: Cigarettes   • Smokeless tobacco: Never Used   • Tobacco comment: quit 2.5 yrs. ago, using e-cigs.    Substance and Sexual Activity   • Alcohol use: Yes     Comment: occas. drink   • Drug use: Never       FAMILY HISTORY:    Family History   Problem Relation Age of Onset   • Diabetes Mother    • Hypertension Mother    • Coronary artery disease Mother    • Depression Father        Review of Systems   Constitutional: Positive for fatigue (easily, HX covid infection in past).   HENT: Negative.    Eyes: Positive for visual disturbance (wears glasses).   Respiratory: Positive for shortness of breath (with exertion).         Denies orthopnea/PND   Cardiovascular: Positive for palpitations ('very rarely\"). Negative for chest pain and leg swelling.   Gastrointestinal: Negative.    Endocrine: Negative.    Genitourinary: Negative.    Musculoskeletal: Positive for arthralgias and myalgias.   Skin: Negative.    Allergic/Immunologic: Negative.    Neurological: Positive for light-headedness (with getting up quick). Negative for syncope.   Hematological: Negative.    Psychiatric/Behavioral: Positive for sleep disturbance.       VISIT VITALS:  Vitals:    09/21/21 1119   Height: 162.6 cm (64.02\")      Ht 162.6 cm (64.02\")   BMI 35.72 kg/m²     RECENT LABS:    Objective       Physical Exam    Procedures      Assessment/Plan   #1.  Non anginal shoulder discomfort.  The patient had no evidence of ischemia on stress testing and echo demonstrated preserved LV systolic function, left ventricular hypertrophy, grade 2 diastolic dysfunction, no significant valve, pericardial, or great vessel pathology.  No further evaluation is indicated at this time.    2.  Dyslipidemia.  With known coronary artery disease I do not feel that an LDL cholesterol of 100 is " optimal.  Therefore we will add Zetia to the patient's current dosing of rosuvastatin and we will recheck labs in 2 to 3 months.    3.  Ms. Chandler will follow with impression as instructed we will plan on seeing her in follow-up in 1 year or on a as needed basis.   Diagnosis Plan   1. Mixed hyperlipidemia     2. MARSHALL (dyspnea on exertion)     3. Atherosclerosis of left anterior descending (LAD) artery     4. QUAN on CPAP     5. Former smoker         No follow-ups on file.         Tonia Chandler  reports that she has quit smoking. Her smoking use included cigarettes. She has never used smokeless tobacco.. I have educated her on the risk of diseases from using tobacco products such as cancer, COPD and heart disease.               Patient's Body mass index is 35.72 kg/m². indicating that she is morbidly obese (BMI > 40 or > 35 with obesity - related health condition). Obesity-related health conditions include the following: obstructive sleep apnea, coronary heart disease and dyslipidemias. Obesity is unchanged. BMI is pcp addressing. We discussed portion control and increasing exercise..       Nancie Foley LPN    Scribed for Dr. Kaz Sen by Nancie Foley LPN September 21, 2021 11:20 EDT         Electronically signed by:            This note is dictated utilizing voice recognition software.  Although this record has been proof read, transcriptional errors may still be present. If questions occur regarding the content of this record please do not hesitate to call our office.

## 2021-09-21 NOTE — PATIENT INSTRUCTIONS
Obesity, Adult  Obesity is the condition of having too much total body fat. Being overweight or obese means that your weight is greater than what is considered healthy for your body size. Obesity is determined by a measurement called BMI. BMI is an estimate of body fat and is calculated from height and weight. For adults, a BMI of 30 or higher is considered obese.  Obesity can lead to other health concerns and major illnesses, including:  · Stroke.  · Coronary artery disease (CAD).  · Type 2 diabetes.  · Some types of cancer, including cancers of the colon, breast, uterus, and gallbladder.  · Osteoarthritis.  · High blood pressure (hypertension).  · High cholesterol.  · Sleep apnea.  · Gallbladder stones.  · Infertility problems.  What are the causes?  Common causes of this condition include:  · Eating daily meals that are high in calories, sugar, and fat.  · Being born with genes that may make you more likely to become obese.  · Having a medical condition that causes obesity, including:  ? Hypothyroidism.  ? Polycystic ovarian syndrome (PCOS).  ? Binge-eating disorder.  ? Cushing syndrome.  · Taking certain medicines, such as steroids, antidepressants, and seizure medicines.  · Not being physically active (sedentary lifestyle).  · Not getting enough sleep.  · Drinking high amounts of sugar-sweetened beverages, such as soft drinks.  What increases the risk?  The following factors may make you more likely to develop this condition:  · Having a family history of obesity.  · Being a woman of  descent.  · Being a man of  descent.  · Living in an area with limited access to:  ? [place], recreation centers, or sidewalks.  ? Healthy food choices, such as grocery stores and farmers' markets.  What are the signs or symptoms?  The main sign of this condition is having too much body fat.  How is this diagnosed?  This condition is diagnosed based on:  · Your BMI. If you are an adult with a BMI of 30 or  higher, you are considered obese.  · Your waist circumference. This measures the distance around your waistline.  · Your skinfold thickness. Your health care provider may gently pinch a fold of your skin and measure it.  You may have other tests to check for underlying conditions.  How is this treated?  Treatment for this condition often includes changing your lifestyle. Treatment may include some or all of the following:  · Dietary changes. This may include developing a healthy meal plan.  · Regular physical activity. This may include activity that causes your heart to beat faster (aerobic exercise) and strength training. Work with your health care provider to design an exercise program that works for you.  · Medicine to help you lose weight if you are unable to lose 1 pound a week after 6 weeks of healthy eating and more physical activity.  · Treating conditions that cause the obesity (underlying conditions).  · Surgery. Surgical options may include gastric banding and gastric bypass. Surgery may be done if:  ? Other treatments have not helped to improve your condition.  ? You have a BMI of 40 or higher.  ? You have life-threatening health problems related to obesity.  Follow these instructions at home:  Eating and drinking    · Follow recommendations from your health care provider about what you eat and drink. Your health care provider may advise you to:  ? Limit fast food, sweets, and processed snack foods.  ? Choose low-fat options, such as low-fat milk instead of whole milk.  ? Eat 5 or more servings of fruits or vegetables every day.  ? Eat at home more often. This gives you more control over what you eat.  ? Choose healthy foods when you eat out.  ? Learn to read food labels. This will help you understand how much food is considered 1 serving.  ? Learn what a healthy serving size is.  ? Keep low-fat snacks available.  ? Limit sugary drinks, such as soda, fruit juice, sweetened iced tea, and flavored  milk.  · Drink enough water to keep your urine pale yellow.  · Do not follow a fad diet. Fad diets can be unhealthy and even dangerous.    Physical activity  · Exercise regularly, as told by your health care provider.  ? Most adults should get up to 150 minutes of moderate-intensity exercise every week.  ? Ask your health care provider what types of exercise are safe for you and how often you should exercise.  · Warm up and stretch before being active.  · Cool down and stretch after being active.  · Rest between periods of activity.  Lifestyle  · Work with your health care provider and a dietitian to set a weight-loss goal that is healthy and reasonable for you.  · Limit your screen time.  · Find ways to reward yourself that do not involve food.  · Do not drink alcohol if:  ? Your health care provider tells you not to drink.  ? You are pregnant, may be pregnant, or are planning to become pregnant.  · If you drink alcohol:  ? Limit how much you use to:  § 0-1 drink a day for women.  § 0-2 drinks a day for men.  ? Be aware of how much alcohol is in your drink. In the [place] one drink equals one 12 oz bottle of beer (355 mL), one 5 oz glass of wine (148 mL), or one 1½ oz glass of hard liquor (44 mL).  General instructions  · Keep a weight-loss journal to keep track of the food you eat and how much exercise you get.  · Take over-the-counter and prescription medicines only as told by your health care provider.  · Take vitamins and supplements only as told by your health care provider.  · Consider joining a support group. Your health care provider may be able to recommend a support group.  · Keep all follow-up visits as told by your health care provider. This is important.  Contact a health care provider if:  · You are unable to meet your weight loss goal after 6 weeks of dietary and lifestyle changes.  Get help right away if you are having:  · Trouble breathing.  · Suicidal thoughts or behaviors.  Summary  · Obesity is  the condition of having too much total body fat.  · Being overweight or obese means that your weight is greater than what is considered healthy for your body size.  · Work with your health care provider and a dietitian to set a weight-loss goal that is healthy and reasonable for you.  · Exercise regularly, as told by your health care provider. Ask your health care provider what types of exercise are safe for you and how often you should exercise.  This information is not intended to replace advice given to you by your health care provider. Make sure you discuss any questions you have with your health care provider.  Document Revised: 08/22/2019 Document Reviewed: 08/22/2019  [employer] Patient Education © 2021 [employer]  BMI for Adults  What is BMI?  Body mass index (BMI) is a number that is calculated from a person's weight and height. BMI can help estimate how much of a person's weight is composed of fat. BMI does not measure body fat directly. Rather, it is an alternative to procedures that directly measure body fat, which can be difficult and expensive.  BMI can help identify people who may be at higher risk for certain medical problems.  What are BMI measurements used for?  BMI is used as a screening tool to identify possible weight problems. It helps determine whether a person is obese, overweight, a healthy weight, or underweight.  BMI is useful for:  · Identifying a weight problem that may be related to a medical condition or may increase the risk for medical problems.  · Promoting changes, such as changes in diet and exercise, to help reach a healthy weight. BMI screening can be repeated to see if these changes are working.  How is BMI calculated?  BMI involves measuring your weight in relation to your height. Both height and weight are measured, and the BMI is calculated from those numbers. This can be done either in English ([place] or metric measurements. Note that charts and online BMI calculators are  "available to help you find your BMI quickly and easily without having to do these calculations yourself.  To calculate your BMI in English ([place] measurements:    1. Measure your weight in pounds (lb).  2. Multiply the number of pounds by 703.  ? For example, for a person who weighs 180 lb, multiply that number by 703, which equals 126,540.  3. Measure your height in inches. Then multiply that number by itself to get a measurement called \"inches squared.\"  ? For example, for a person who is 70 inches tall, the \"inches squared\" measurement is 70 inches x 70 inches, which equals 4,900 inches squared.  4. Divide the total from step 2 (number of lb x 703) by the total from step 3 (inches squared): 126,540 ÷ 4,900 = 25.8. This is your BMI.    To calculate your BMI in metric measurements:  1. Measure your weight in kilograms (kg).  2. Measure your height in meters (m). Then multiply that number by itself to get a measurement called \"meters squared.\"  ? For example, for a person who is 1.75 m tall, the \"meters squared\" measurement is 1.75 m x 1.75 m, which is equal to 3.1 meters squared.  3. Divide the number of kilograms (your weight) by the meters squared number. In this example: 70 ÷ 3.1 = 22.6. This is your BMI.  What do the results mean?  BMI charts are used to identify whether you are underweight, normal weight, overweight, or obese. The following guidelines will be used:  · Underweight: BMI less than 18.5.  · Normal weight: BMI between 18.5 and 24.9.  · Overweight: BMI between 25 and 29.9.  · Obese: BMI of 30 or above.  Keep these notes in mind:  · Weight includes both fat and muscle, so someone with a muscular build, such as an athlete, may have a BMI that is higher than 24.9. In cases like these, BMI is not an accurate measure of body fat.  · To determine if excess body fat is the cause of a BMI of 25 or higher, further assessments may need to be done by a health care provider.  · BMI is usually interpreted in the " same way for men and women.  Where to find more information  For more information about BMI, including tools to quickly calculate your BMI, go to these websites:  · Centers for Disease Control and Prevention: www.cdc.gov  · American Heart Association: www.heart.org  · National Heart, Lung, and Blood [place]: www.nhlbi.nih.gov  Summary  · Body mass index (BMI) is a number that is calculated from a person's weight and height.  · BMI may help estimate how much of a person's weight is composed of fat. BMI can help identify those who may be at higher risk for certain medical problems.  · BMI can be measured using English measurements or metric measurements.  · BMI charts are used to identify whether you are underweight, normal weight, overweight, or obese.  This information is not intended to replace advice given to you by your health care provider. Make sure you discuss any questions you have with your health care provider.  Document Revised: 09/09/2020 Document Reviewed: 07/17/2020  [employer] Patient Education © 2021 [employer]

## 2021-11-11 ENCOUNTER — TELEPHONE (OUTPATIENT)
Dept: CARDIOLOGY | Facility: CLINIC | Age: 61
End: 2021-11-11

## 2021-11-11 NOTE — TELEPHONE ENCOUNTER
PCP HAD ALREADY DISCUSSED CHOL. RESULTS WITH MRS. HARVEY. RELAYED TO HER DR. SEN AND I HAD REVIEWED RESULTS THIS AM AND NO CHANGES NEEDED. VERBALIZED OK. PH,LPN            ----- Message from Kaz Sen MD sent at 11/11/2021  2:29 PM EST -----  Call with results.

## 2022-04-19 ENCOUNTER — OFFICE VISIT (OUTPATIENT)
Dept: CARDIOLOGY | Facility: CLINIC | Age: 62
End: 2022-04-19

## 2022-04-19 ENCOUNTER — LAB (OUTPATIENT)
Dept: CARDIOLOGY | Facility: CLINIC | Age: 62
End: 2022-04-19

## 2022-04-19 VITALS
BODY MASS INDEX: 38 KG/M2 | HEART RATE: 63 BPM | SYSTOLIC BLOOD PRESSURE: 178 MMHG | DIASTOLIC BLOOD PRESSURE: 97 MMHG | OXYGEN SATURATION: 97 % | HEIGHT: 64 IN | WEIGHT: 222.6 LBS

## 2022-04-19 DIAGNOSIS — G47.33 OSA ON CPAP: ICD-10-CM

## 2022-04-19 DIAGNOSIS — Z87.891 FORMER SMOKER: ICD-10-CM

## 2022-04-19 DIAGNOSIS — I25.10 ATHEROSCLEROSIS OF LEFT ANTERIOR DESCENDING (LAD) ARTERY: ICD-10-CM

## 2022-04-19 DIAGNOSIS — R07.2 PRECORDIAL PAIN: ICD-10-CM

## 2022-04-19 DIAGNOSIS — E78.2 MIXED HYPERLIPIDEMIA: Primary | ICD-10-CM

## 2022-04-19 DIAGNOSIS — R00.2 PALPITATIONS: ICD-10-CM

## 2022-04-19 DIAGNOSIS — R06.02 SHORTNESS OF BREATH: ICD-10-CM

## 2022-04-19 DIAGNOSIS — E78.2 MIXED HYPERLIPIDEMIA: ICD-10-CM

## 2022-04-19 DIAGNOSIS — Z99.89 OSA ON CPAP: ICD-10-CM

## 2022-04-19 PROBLEM — R06.09 DOE (DYSPNEA ON EXERTION): Status: RESOLVED | Noted: 2021-04-15 | Resolved: 2022-04-19

## 2022-04-19 LAB
ANION GAP SERPL CALCULATED.3IONS-SCNC: 13.8 MMOL/L (ref 5–15)
BASOPHILS # BLD AUTO: 0.1 10*3/MM3 (ref 0–0.2)
BASOPHILS NFR BLD AUTO: 1.3 % (ref 0–1.5)
BUN SERPL-MCNC: 12 MG/DL (ref 8–23)
BUN/CREAT SERPL: 16.2 (ref 7–25)
CALCIUM SPEC-SCNC: 10.1 MG/DL (ref 8.6–10.5)
CHLORIDE SERPL-SCNC: 99 MMOL/L (ref 98–107)
CO2 SERPL-SCNC: 24.2 MMOL/L (ref 22–29)
CREAT SERPL-MCNC: 0.74 MG/DL (ref 0.57–1)
DEPRECATED RDW RBC AUTO: 46.6 FL (ref 37–54)
EGFRCR SERPLBLD CKD-EPI 2021: 91 ML/MIN/1.73
EOSINOPHIL # BLD AUTO: 0.19 10*3/MM3 (ref 0–0.4)
EOSINOPHIL NFR BLD AUTO: 2.5 % (ref 0.3–6.2)
ERYTHROCYTE [DISTWIDTH] IN BLOOD BY AUTOMATED COUNT: 13.6 % (ref 12.3–15.4)
GLUCOSE SERPL-MCNC: 135 MG/DL (ref 65–99)
HCT VFR BLD AUTO: 46.5 % (ref 34–46.6)
HGB BLD-MCNC: 15.1 G/DL (ref 12–15.9)
IMM GRANULOCYTES # BLD AUTO: 0.02 10*3/MM3 (ref 0–0.05)
IMM GRANULOCYTES NFR BLD AUTO: 0.3 % (ref 0–0.5)
LYMPHOCYTES # BLD AUTO: 2.06 10*3/MM3 (ref 0.7–3.1)
LYMPHOCYTES NFR BLD AUTO: 27.3 % (ref 19.6–45.3)
MCH RBC QN AUTO: 30.4 PG (ref 26.6–33)
MCHC RBC AUTO-ENTMCNC: 32.5 G/DL (ref 31.5–35.7)
MCV RBC AUTO: 93.6 FL (ref 79–97)
MONOCYTES # BLD AUTO: 0.55 10*3/MM3 (ref 0.1–0.9)
MONOCYTES NFR BLD AUTO: 7.3 % (ref 5–12)
NEUTROPHILS NFR BLD AUTO: 4.62 10*3/MM3 (ref 1.7–7)
NEUTROPHILS NFR BLD AUTO: 61.3 % (ref 42.7–76)
NRBC BLD AUTO-RTO: 0 /100 WBC (ref 0–0.2)
PLATELET # BLD AUTO: 193 10*3/MM3 (ref 140–450)
PMV BLD AUTO: 10.5 FL (ref 6–12)
POTASSIUM SERPL-SCNC: 4.2 MMOL/L (ref 3.5–5.2)
RBC # BLD AUTO: 4.97 10*6/MM3 (ref 3.77–5.28)
SODIUM SERPL-SCNC: 137 MMOL/L (ref 136–145)
WBC NRBC COR # BLD: 7.54 10*3/MM3 (ref 3.4–10.8)

## 2022-04-19 PROCEDURE — 99214 OFFICE O/P EST MOD 30 MIN: CPT | Performed by: INTERNAL MEDICINE

## 2022-04-19 PROCEDURE — 85025 COMPLETE CBC W/AUTO DIFF WBC: CPT | Performed by: INTERNAL MEDICINE

## 2022-04-19 PROCEDURE — 80048 BASIC METABOLIC PNL TOTAL CA: CPT | Performed by: INTERNAL MEDICINE

## 2022-04-19 PROCEDURE — 36415 COLL VENOUS BLD VENIPUNCTURE: CPT

## 2022-04-19 PROCEDURE — 93000 ELECTROCARDIOGRAM COMPLETE: CPT | Performed by: INTERNAL MEDICINE

## 2022-04-19 RX ORDER — AMLODIPINE BESYLATE 2.5 MG/1
2.5 TABLET ORAL DAILY
Qty: 30 TABLET | Refills: 11 | Status: SHIPPED | OUTPATIENT
Start: 2022-04-19 | End: 2023-02-22 | Stop reason: SDUPTHER

## 2022-04-19 RX ORDER — NITROGLYCERIN 0.4 MG/1
0.4 TABLET SUBLINGUAL
Qty: 25 TABLET | Refills: 2 | Status: SHIPPED | OUTPATIENT
Start: 2022-04-19

## 2022-04-19 NOTE — PROGRESS NOTES
Subjective   Tonia Chandler is a 61 y.o. female     Chief Complaint   Patient presents with   • Chest Pain     Here for eval.    • Shortness of Breath   • Palpitations       PROBLEM LIST:     1. LAD, severe calcification per LDCT scan on 4-   1.1 Stress test, 6-4-2021, no ischemia  2. MARSHALL  3. Hyperlipidemia  4. QUAN, on CPAP. Followed By Dr. Zambrano  5. Migraines  6. Hypothyroidism  7. Former smoker  8. Iron Def. Anemia  9. Echo, 6-4-2021, EF 50%, grade 2 DD, trivial AI/MR/TR  10. Currently uses E-Cig. Quit smoking 3 yrs. ago    Specialty Problems        Cardiology Problems    Atherosclerosis of left anterior descending (LAD) artery        Migraines        Mixed hyperlipidemia                HPI:  Ms. Chandler returns for follow-up on the above.    She describes a new chest discomfort.  She has a sharp, lancinating left precordial pain which begins in the mid sternal area and radiates to the left shoulder.  This discomfort is very short-lived but recurs repeatedly when it is present.  It is not reliably precipitated by physical activity or emotional stress.  The discomfort is nonpositional and nonpleuritic.    The patient also describes a gradual decrease in functional capacity and increasing exertional dyspnea since the time of her last visit.  She has no ziggy orthopnea or PND but describes awakening at night not being able to fall back to sleep both despite using her CPAP mask.  The patient has no claudication or symptoms of TIA or stroke.  Ms. Chandler states the blood pressures are significantly lower at home than those obtained today.                      PRIOR MEDICATIONS    Current Outpatient Medications on File Prior to Visit   Medication Sig Dispense Refill   • ARIPiprazole (ABILIFY) 10 MG tablet Take 10 mg by mouth Daily.     • Ascorbic Acid (VITAMIN C PO) Take  by mouth Daily.     • aspirin 81 MG EC tablet Take 81 mg by mouth Daily.     • ezetimibe (ZETIA) 10 MG tablet Take 1 tablet by mouth  Daily. 90 tablet 3   • ibuprofen (ADVIL,MOTRIN) 800 MG tablet Take 800 mg by mouth Every 8 (Eight) Hours As Needed for Mild Pain .     • levothyroxine (SYNTHROID, LEVOTHROID) 50 MCG tablet Take 50 mcg by mouth Daily.     • metoprolol tartrate (LOPRESSOR) 25 MG tablet Take 25 mg by mouth 2 (Two) Times a Day.     • Multiple Vitamins-Minerals (ZINC PO) Take  by mouth Daily.     • omeprazole (priLOSEC) 20 MG capsule Take 20 mg by mouth Daily.     • potassium chloride (MICRO-K) 10 MEQ CR capsule Take 10 mEq by mouth Daily As Needed.     • QUEtiapine (SEROquel) 300 MG tablet Take 300 mg by mouth Every Night.     • rosuvastatin (CRESTOR) 20 MG tablet Take 20 mg by mouth Daily.     • SUMAtriptan (IMITREX) 20 MG/ACT nasal spray 1 spray into the nostril(s) as directed by provider Every 2 (Two) Hours As Needed.     • torsemide (DEMADEX) 20 MG tablet Daily As Needed.     • traZODone (DESYREL) 150 MG tablet Take 150 mg by mouth Every Night.     • vitamin B-12 (CYANOCOBALAMIN) 1000 MCG tablet Take 1,000 mcg by mouth Daily.       No current facility-administered medications on file prior to visit.       ALLERGIES:    Hydrocodone    PAST MEDICAL HISTORY:    Past Medical History:   Diagnosis Date   • Atherosclerosis of left anterior descending (LAD) artery    • Cancer (HCC)     melanoma   • MARSHALL (dyspnea on exertion)    • Emphysema of lung (HCC)     per LDCT scan 2021   • Former smoker    • GERD (gastroesophageal reflux disease)    • Hyperlipidemia    • Hypothyroidism    • Iron deficiency anemia    • Migraines    • QUAN on CPAP     uses cpap   • Tachycardia        SURGICAL HISTORY:    Past Surgical History:   Procedure Laterality Date   • APPENDECTOMY     •  SECTION     • ENDOSCOPY AND COLONOSCOPY     • ORIF FEMUR FRACTURE       and knee   • TONSILLECTOMY AND ADENOIDECTOMY         SOCIAL HISTORY:    Social History     Socioeconomic History   • Marital status:    Tobacco Use   • Smoking status: Former Smoker  "    Types: Cigarettes   • Smokeless tobacco: Never Used   • Tobacco comment: quit 2.5 yrs. ago, using e-cigs.    Substance and Sexual Activity   • Alcohol use: Yes     Comment: occas. drink   • Drug use: Never       FAMILY HISTORY:    Family History   Problem Relation Age of Onset   • Diabetes Mother    • Hypertension Mother    • Coronary artery disease Mother    • Depression Father        Review of Systems   Constitutional: Positive for fatigue (increased).   HENT: Negative.    Eyes: Positive for visual disturbance (glasses).   Respiratory: Positive for shortness of breath (hills/inclines, worsening).         Denies orthopnea/PND, uses CPAP   Cardiovascular: Positive for chest pain and palpitations (\"every once in a great while\"). Negative for leg swelling.   Gastrointestinal: Negative.    Endocrine: Negative.    Genitourinary: Negative.    Musculoskeletal: Positive for arthralgias and myalgias.        Denies leg cramps with ambulation   Skin: Negative.    Allergic/Immunologic: Negative.    Neurological: Positive for dizziness (occas. with sitting up in bed). Negative for syncope.        Denies stroke like sx's   Hematological: Bruises/bleeds easily (on ASA).   Psychiatric/Behavioral: Positive for sleep disturbance (off and on).       VISIT VITALS:  Vitals:    04/19/22 0911   BP: 178/97   BP Location: Left arm   Patient Position: Sitting   Pulse: 63   SpO2: 97%   Weight: 101 kg (222 lb 9.6 oz)   Height: 162.6 cm (64.02\")      /97 (BP Location: Left arm, Patient Position: Sitting)   Pulse 63   Ht 162.6 cm (64.02\")   Wt 101 kg (222 lb 9.6 oz)   SpO2 97%   BMI 38.19 kg/m²     RECENT LABS:    Objective       Physical Exam  Vitals and nursing note reviewed.   Constitutional:       General: She is not in acute distress.     Appearance: She is well-developed.   HENT:      Head: Normocephalic and atraumatic.   Eyes:      Conjunctiva/sclera: Conjunctivae normal.      Pupils: Pupils are equal, round, and reactive " to light.   Neck:      Vascular: No carotid bruit, hepatojugular reflux or JVD.      Trachea: No tracheal deviation.      Comments: Nl. Carotid upstrokes  Cardiovascular:      Rate and Rhythm: Normal rate and regular rhythm. Occasional extrasystoles are present.     Pulses:           Radial pulses are 2+ on the right side and 2+ on the left side.      Heart sounds: S1 normal and S2 normal. Murmur heard.     No friction rub. Gallop present. S4 sounds present. No S3 sounds.      Comments: 2/6 TR  No MR  No AI  Pulmonary:      Effort: Pulmonary effort is normal.      Breath sounds: Normal breath sounds. No wheezing, rhonchi or rales.      Comments: Nl. Expir. Phase  Nl. Breath sound intensity    Abdominal:      General: Bowel sounds are normal. There is no distension or abdominal bruit.      Palpations: Abdomen is soft. There is no mass.      Tenderness: There is no abdominal tenderness. There is no guarding or rebound.      Comments: No organomegaly   Musculoskeletal:         General: No tenderness or deformity. Normal range of motion.      Cervical back: Normal range of motion and neck supple.      Right lower leg: No edema.      Left lower leg: No edema.      Comments: LLE, no edema, palpable pedal pulses, cap. Refill 3-4 sec.   RLE, no edema, palpable pedal pulses, cap. Refill < 3 sec. No sign. Igor. Stasis changes   Skin:     General: Skin is warm and dry.      Coloration: Skin is not pale.      Findings: No erythema or rash.   Neurological:      Mental Status: She is alert and oriented to person, place, and time.   Psychiatric:         Behavior: Behavior normal.         Thought Content: Thought content normal.         Judgment: Judgment normal.           ECG 12 Lead    Date/Time: 4/19/2022 10:30 AM  Performed by: Kaz Sen MD  Authorized by: Kaz Sen MD   Comparison: compared with previous ECG from 4/19/2021  Similar to previous ECG  Comments: Normal sinus rhythm at 62.  Unusual P wave wave  morphology with borderline short DE but no delta wave.  No significant change from prior.              Assessment/Plan   #1.  Chest pain.  The patient describes chest pain atypical for angina but different than the clearly nonanginal symptoms she described previously.  Especially concerning is he describes progressive exertional dyspnea and easy fatigability.  These symptoms, in conjunction with known coronary artery disease as documented by CTA, I feel place the patient in increased risk for adverse cardiovascular events.  Therefore, I feel that cardiac catheterization is warranted for definitive assessment for diagnostic, prognostic, and potentially for therapeutic purposes.  We will schedule in the near future.    2.  Systemic hypertension.  We will add amlodipine 2.5 mg daily which will also provide some antianginal therapy.    3.  Possible angina.  The patient is given a prescription for sublingual nitroglycerin with instructions in its use.    4.  Treated dyslipidemia.    5.  The patient will be scheduled for cardiac catheterization on an expedited basis.  She will follow-up with Elis Paez as instructed with further recommendations from our office based on findings at the time of cardiac catheterization.   Diagnosis Plan   1. Mixed hyperlipidemia     2. Atherosclerosis of left anterior descending (LAD) artery     3. QUAN on CPAP     4. Former smoker     5. Palpitations         No follow-ups on file.         Tonia Chandler  reports that she has quit smoking. Her smoking use included cigarettes. She has never used smokeless tobacco.. I have educated her on the risk of diseases from using tobacco products such as cancer, COPD and heart disease.             Patient's Body mass index is 38.19 kg/m². indicating that she is morbidly obese (BMI > 40 or > 35 with obesity - related health condition). Obesity-related health conditions include the following: obstructive sleep apnea, coronary heart disease,  dyslipidemias and hypothyroidism, anemia. Obesity is worsening. BMI is pcp addressing. We discussed portion control and increasing exercise..             Electronically signed by:    Scribed for Kaz Sen MD by Nancie Foley LPN on April 19, 2022  at 09:19 EDT    I, Kaz Sen MD personally performed the services described in this documentation as scribed by the above named individual in my presence, and it is both accurate and complete. April 19, 2022 09:19 EDT      This note is dictated utilizing voice recognition software.  Although this record has been proof read, transcriptional errors may still be present. If questions occur regarding the content of this record please do not hesitate to call our office.

## 2022-04-21 ENCOUNTER — TELEPHONE (OUTPATIENT)
Dept: CARDIOLOGY | Facility: CLINIC | Age: 62
End: 2022-04-21

## 2022-04-21 NOTE — TELEPHONE ENCOUNTER
"Pt called and states BP was 152/92 @11:00am when at dentist,after medication.    2:15 pm 150/92 hr 102     Started new medication amlodipine 04/19/22 and Bp seems to still be high, she states \"Renu never had any BP problems my whole life, so it really worries me.\"    Also take metoprolol 25 mg bid.  "

## 2022-04-21 NOTE — TELEPHONE ENCOUNTER
Spoke with Donovan Ash,PAC  States amlodipine can take 3-5 days to fully take effect, she needs to continue monitoring. Call with any concerns, new or worsening sx go to ER.    Verbally understands.

## 2022-04-26 ENCOUNTER — OUTSIDE FACILITY SERVICE (OUTPATIENT)
Dept: CARDIOLOGY | Facility: CLINIC | Age: 62
End: 2022-04-26

## 2022-04-26 PROCEDURE — 93458 L HRT ARTERY/VENTRICLE ANGIO: CPT | Performed by: INTERNAL MEDICINE

## 2022-05-10 ENCOUNTER — OFFICE VISIT (OUTPATIENT)
Dept: CARDIOLOGY | Facility: CLINIC | Age: 62
End: 2022-05-10

## 2022-05-10 VITALS
HEIGHT: 64 IN | DIASTOLIC BLOOD PRESSURE: 81 MMHG | SYSTOLIC BLOOD PRESSURE: 158 MMHG | HEART RATE: 78 BPM | OXYGEN SATURATION: 96 % | WEIGHT: 224.2 LBS | BODY MASS INDEX: 38.28 KG/M2

## 2022-05-10 DIAGNOSIS — I10 ESSENTIAL HYPERTENSION: ICD-10-CM

## 2022-05-10 DIAGNOSIS — R07.2 PRECORDIAL PAIN: ICD-10-CM

## 2022-05-10 DIAGNOSIS — R00.2 PALPITATIONS: Primary | ICD-10-CM

## 2022-05-10 DIAGNOSIS — R06.09 DOE (DYSPNEA ON EXERTION): ICD-10-CM

## 2022-05-10 PROCEDURE — 99214 OFFICE O/P EST MOD 30 MIN: CPT | Performed by: NURSE PRACTITIONER

## 2022-05-10 RX ORDER — METOPROLOL TARTRATE 50 MG/1
50 TABLET, FILM COATED ORAL 2 TIMES DAILY
Qty: 60 TABLET | Refills: 11 | Status: SHIPPED | OUTPATIENT
Start: 2022-05-10 | End: 2022-12-28

## 2022-05-10 NOTE — PROGRESS NOTES
Subjective     Can Chandler is a 61 y.o. female who presents to day for cath follow up  (No stenting).    CHIEF COMPLIANT  Chief Complaint   Patient presents with   • cath follow up      No stenting       Active Problems:  Problem List Items Addressed This Visit        Cardiac and Vasculature    Palpitations - Primary    Relevant Medications    metoprolol tartrate (LOPRESSOR) 50 MG tablet      Other Visit Diagnoses     Precordial pain        Relevant Medications    metoprolol tartrate (LOPRESSOR) 50 MG tablet    MARSHALL (dyspnea on exertion)        Relevant Medications    metoprolol tartrate (LOPRESSOR) 50 MG tablet    Essential hypertension        Relevant Medications    metoprolol tartrate (LOPRESSOR) 50 MG tablet      PROBLEM LIST:      1. LAD, severe calcification per LDCT scan on 4-   1.1 left heart cath 4/22: Left main normal, circumflex normal, LAD minor irregularities, RCA normal, EF 55%, LVEDP 18-22  2. MARSHALL  3. Hyperlipidemia  4. QUAN, on CPAP. Followed By Dr. Zambrano  5. Migraines  6. Hypothyroidism  7. Former smoker  8. Iron Def. Anemia  9. Echo, 6-4-2021, EF 50%, grade 2 DD, trivial AI/MR/TR  10. Currently uses E-Cig. Quit smoking 3 yrs. ago    HPI  HPI  Ms. Chandler is a 61-year-old female patient who is being followed up today for chest pain, shortness of breath and palpitations in which she recently went under left heart catheterization.  The left heart catheterization revealed relatively normal coronary arteries with preserved ejection fraction of 55% with an elevated left ventricular end-diastolic pressure of 18 to 22 mmHg.  Patient denies any chest pain at this time she also denies any complications of the right radial cath site.  Such as numbness or tingling in her hands.  She does report chronic shortness of breath is unchanged nonprogressive.  She does report palpitations that occur where she has a rare intermittent fluttering type sensation in her chest.  This does not interfere with  her activities of daily living.  She does report fatigue where she gets tired very easily.  She also has a history of chronic arterial hypertension where she is on amlodipine and metoprolol.  We did review patient's blood pressure log in which she presented.  It does appear that the for the predominant amount of the time that her blood pressure is within controlled range however she did have some occasions where her blood pressure would significantly increase up to 170s over 100.  This was noted on 4 different occasions during her blood pressure log.  We will scan her blood pressure log into her medical record for further evaluation.  Patient denies any chest pain, lower extremity edema, syncope, PND, orthopnea, or strokelike symptoms.  PRIOR MEDS  Current Outpatient Medications on File Prior to Visit   Medication Sig Dispense Refill   • amLODIPine (NORVASC) 2.5 MG tablet Take 1 tablet by mouth Daily. 30 tablet 11   • Ascorbic Acid (VITAMIN C PO) Take  by mouth Daily.     • aspirin 81 MG EC tablet Take 81 mg by mouth Daily.     • ezetimibe (ZETIA) 10 MG tablet Take 1 tablet by mouth Daily. 90 tablet 3   • ibuprofen (ADVIL,MOTRIN) 800 MG tablet Take 800 mg by mouth Every 8 (Eight) Hours As Needed for Mild Pain .     • levothyroxine (SYNTHROID, LEVOTHROID) 50 MCG tablet Take 50 mcg by mouth Daily.     • Multiple Vitamins-Minerals (ZINC PO) Take  by mouth Daily.     • nitroglycerin (NITROSTAT) 0.4 MG SL tablet Place 1 tablet under the tongue Every 5 (Five) Minutes As Needed for Chest Pain. With max of 3 doses within 15 minutes 25 tablet 2   • omeprazole (priLOSEC) 20 MG capsule Take 20 mg by mouth Daily.     • potassium chloride (MICRO-K) 10 MEQ CR capsule Take 10 mEq by mouth Daily As Needed.     • QUEtiapine (SEROquel) 300 MG tablet Take 300 mg by mouth Every Night.     • rosuvastatin (CRESTOR) 20 MG tablet Take 20 mg by mouth Daily.     • SUMAtriptan (IMITREX) 20 MG/ACT nasal spray 1 spray into the nostril(s) as  directed by provider Every 2 (Two) Hours As Needed.     • torsemide (DEMADEX) 20 MG tablet Daily As Needed.     • traZODone (DESYREL) 150 MG tablet Take 150 mg by mouth Every Night.     • vitamin B-12 (CYANOCOBALAMIN) 1000 MCG tablet Take 1,000 mcg by mouth Daily.       No current facility-administered medications on file prior to visit.       ALLERGIES  Hydrocodone    HISTORY  Past Medical History:   Diagnosis Date   • Atherosclerosis of left anterior descending (LAD) artery    • Cancer (HCC)     melanoma   • MARSHALL (dyspnea on exertion)    • Emphysema of lung (HCC)     per LDCT scan 4-   • Former smoker    • GERD (gastroesophageal reflux disease)    • Hyperlipidemia    • Hypothyroidism    • Iron deficiency anemia    • Migraines    • QUAN on CPAP     uses cpap   • Tachycardia        Social History     Socioeconomic History   • Marital status:    Tobacco Use   • Smoking status: Former Smoker     Types: Cigarettes   • Smokeless tobacco: Never Used   • Tobacco comment: quit 2.5 yrs. ago, using e-cigs.    Substance and Sexual Activity   • Alcohol use: Yes     Comment: occas. drink   • Drug use: Never       Family History   Problem Relation Age of Onset   • Diabetes Mother    • Hypertension Mother    • Coronary artery disease Mother    • Depression Father        Review of Systems   Constitutional: Positive for fatigue. Negative for chills and fever.   HENT: Negative for congestion, rhinorrhea and sore throat.    Respiratory: Positive for shortness of breath. Negative for chest tightness.    Cardiovascular: Positive for palpitations (flutter rare). Negative for chest pain and leg swelling.   Gastrointestinal: Negative for constipation, diarrhea and nausea.   Musculoskeletal: Positive for arthralgias (knees). Negative for back pain and neck pain.   Allergic/Immunologic: Negative for environmental allergies and food allergies.   Neurological: Positive for dizziness (with getting to quick) and light-headedness.  "Negative for syncope and weakness.   Hematological: Does not bruise/bleed easily.   Psychiatric/Behavioral: Negative for sleep disturbance.       Objective     VITALS: /81 (BP Location: Left arm, Patient Position: Sitting)   Pulse 78   Ht 162.6 cm (64.02\")   Wt 102 kg (224 lb 3.2 oz)   SpO2 96%   BMI 38.46 kg/m²     LABS:   Lab Results (most recent)     None          IMAGING:   No Images in the past 120 days found..    EXAM:  Physical Exam  Vitals and nursing note reviewed.   Constitutional:       Appearance: She is well-developed.   HENT:      Head: Normocephalic.   Eyes:      Pupils: Pupils are equal, round, and reactive to light.   Neck:      Thyroid: No thyroid mass.      Vascular: No carotid bruit or JVD.      Trachea: Trachea and phonation normal.   Cardiovascular:      Rate and Rhythm: Normal rate and regular rhythm.      Pulses:           Radial pulses are 2+ on the right side and 2+ on the left side.        Posterior tibial pulses are 2+ on the right side and 2+ on the left side.      Heart sounds: Normal heart sounds. No murmur heard.    No friction rub. No gallop.   Pulmonary:      Effort: Pulmonary effort is normal. No respiratory distress.      Breath sounds: Normal breath sounds. No wheezing or rales.   Abdominal:      General: Bowel sounds are normal.      Palpations: Abdomen is soft.   Musculoskeletal:         General: No swelling. Normal range of motion.      Cervical back: Neck supple.   Skin:     General: Skin is warm and dry.      Capillary Refill: Capillary refill takes less than 2 seconds.      Findings: No rash.   Neurological:      Mental Status: She is alert and oriented to person, place, and time.   Psychiatric:         Speech: Speech normal.         Behavior: Behavior normal.         Thought Content: Thought content normal.         Judgment: Judgment normal.         Procedure   Procedures       [unfilled]   Diagnosis Plan   1. Palpitations  metoprolol tartrate " (LOPRESSOR) 50 MG tablet   2. Precordial pain  metoprolol tartrate (LOPRESSOR) 50 MG tablet   3. MARSHALL (dyspnea on exertion)  metoprolol tartrate (LOPRESSOR) 50 MG tablet   4. Essential hypertension  metoprolol tartrate (LOPRESSOR) 50 MG tablet   1.  Patient's palpitations do not interfere with her activities of daily living but due to elevated heart rates on occasion on her blood pressure log as well as intermittent episodes of significant hypertension I would like to increase her metoprolol to 50 mg twice daily to see if we can better control her symptoms.  2.  Right radial cath site is without hematoma or exudate.  Right radial pulse is palpable proximal and distal to the insertion site.  Patient denies any loss of sensation, numbness, or tingling.  Capillary refill within 2 seconds.  3.  Informed of signs and symptoms of ACS and advised to seek emergent treatment for any new worsening symptoms.  Patient also advised sooner follow-up as needed.  Also advised to follow-up with family doctor as needed  This note is dictated utilizing voice recognition software.  Although this record has been proof read, transcriptional errors may still be present. If questions occur regarding the content of this record please do not hesitate to call our office.  I have reviewed and confirmed the accuracy of the ROS as documented by the MA/LPN/RN ALAN Hoskins    Return in about 3 months (around 8/10/2022), or if symptoms worsen or fail to improve.    Diagnoses and all orders for this visit:    1. Palpitations (Primary)  -     metoprolol tartrate (LOPRESSOR) 50 MG tablet; Take 1 tablet by mouth 2 (Two) Times a Day.  Dispense: 60 tablet; Refill: 11    2. Precordial pain  -     metoprolol tartrate (LOPRESSOR) 50 MG tablet; Take 1 tablet by mouth 2 (Two) Times a Day.  Dispense: 60 tablet; Refill: 11    3. MARSHALL (dyspnea on exertion)  -     metoprolol tartrate (LOPRESSOR) 50 MG tablet; Take 1 tablet by mouth 2 (Two) Times a Day.   Dispense: 60 tablet; Refill: 11    4. Essential hypertension  -     metoprolol tartrate (LOPRESSOR) 50 MG tablet; Take 1 tablet by mouth 2 (Two) Times a Day.  Dispense: 60 tablet; Refill: 11        Can Chandler  reports that she has quit smoking. Her smoking use included cigarettes. She has never used smokeless tobacco.. I have educated her on the risk of diseases from using tobacco products        MEDS ORDERED DURING VISIT:  New Medications Ordered This Visit   Medications   • metoprolol tartrate (LOPRESSOR) 50 MG tablet     Sig: Take 1 tablet by mouth 2 (Two) Times a Day.     Dispense:  60 tablet     Refill:  11           This document has been electronically signed by Brian Rivera Jr., APRN  May 16, 2022 10:39 EDT

## 2022-08-08 DIAGNOSIS — I25.10 ATHEROSCLEROSIS OF LEFT ANTERIOR DESCENDING (LAD) ARTERY: ICD-10-CM

## 2022-08-08 DIAGNOSIS — E78.2 MIXED HYPERLIPIDEMIA: ICD-10-CM

## 2022-08-08 RX ORDER — EZETIMIBE 10 MG/1
10 TABLET ORAL DAILY
Qty: 90 TABLET | Refills: 3 | Status: SHIPPED | OUTPATIENT
Start: 2022-08-08 | End: 2022-09-06

## 2022-08-25 ENCOUNTER — OFFICE VISIT (OUTPATIENT)
Dept: CARDIOLOGY | Facility: CLINIC | Age: 62
End: 2022-08-25

## 2022-08-25 VITALS
HEART RATE: 95 BPM | OXYGEN SATURATION: 98 % | DIASTOLIC BLOOD PRESSURE: 78 MMHG | WEIGHT: 210.4 LBS | BODY MASS INDEX: 35.92 KG/M2 | HEIGHT: 64 IN | SYSTOLIC BLOOD PRESSURE: 123 MMHG

## 2022-08-25 DIAGNOSIS — R00.2 PALPITATIONS: ICD-10-CM

## 2022-08-25 DIAGNOSIS — E78.2 MIXED HYPERLIPIDEMIA: ICD-10-CM

## 2022-08-25 DIAGNOSIS — I25.10 ATHEROSCLEROSIS OF LEFT ANTERIOR DESCENDING (LAD) ARTERY: ICD-10-CM

## 2022-08-25 DIAGNOSIS — I10 PRIMARY HYPERTENSION: Primary | ICD-10-CM

## 2022-08-25 PROCEDURE — 99213 OFFICE O/P EST LOW 20 MIN: CPT | Performed by: NURSE PRACTITIONER

## 2022-08-25 RX ORDER — GLIPIZIDE 5 MG/1
5 TABLET ORAL DAILY
COMMUNITY

## 2022-08-25 NOTE — PROGRESS NOTES
"Subjective     Can Chandler is a 61 y.o. female who presents to day for Rapid Heart Rate and 3 month follow up .    CHIEF COMPLIANT  Chief Complaint   Patient presents with   • Rapid Heart Rate   • 3 month follow up        Active Problems:  Problem List Items Addressed This Visit        Cardiac and Vasculature    Mixed hyperlipidemia    Atherosclerosis of left anterior descending (LAD) artery    Palpitations      Other Visit Diagnoses     Primary hypertension    -  Primary      PROBLEM LIST:      1. LAD, severe calcification per LDCT scan on 4-   1.1 left heart cath 4/22: Left main normal, circumflex normal, LAD minor irregularities, RCA normal, EF 55%, LVEDP 18-22  2. MARSHALL  3. Hyperlipidemia   4. QUAN, on CPAP. Followed By Dr. Zambrano  5. Migraines  6. Hypothyroidism  7. Former smoker  8. Iron Def. Anemia  9. Echo, 6-4-2021, EF 50%, grade 2 DD, trivial AI/MR/TR  10. Currently uses E-Cig. Quit smoking 3 yrs. ago    HPI  HPI    Tonia Chandler is a 61-year-old female who presents today for follow up of palpitations. Her heart rate is 93 BPM today.      She states her heart rate has been \"all over the place.\" She said her heart rate was 123 BPM this morning with no activity. She took her morning medications at 7:30 AM. She starts feeling palpitations but when she checks her heart rate it will be in the 50s during this symptom.      Recently she had labs obtained and was diagnosed with diabetes. She states her primary care called her and said her blood glucose was 534. She went for a re-check and her blood glucose was 513 and hemoglobin A1c was 13.9. This morning her blood glucose was 356. She was started on metformin 500 MG extended release, glipizide 5 MG, and Ozempic 0.5 MG. She states her blood glucose has still been high even with the medications. The patient notes that she was in [place] and was urinating frequently and constantly thirsty. She states she knows those are signs of diabetes but she " did not go to the doctor.  She states she was taking Latuda but discontinued the medication since it was not improving her depression and it also can raise blood glucose. She states she is trying to eat better. She has noticed her vision has been off recently.      The patient has been thinking about having weight loss surgery. She states she has been doing research on it has read reports of it improving type 2 diabetes.       PRIOR MEDS  Current Outpatient Medications on File Prior to Visit   Medication Sig Dispense Refill   • amLODIPine (NORVASC) 2.5 MG tablet Take 1 tablet by mouth Daily. 30 tablet 11   • Ascorbic Acid (VITAMIN C PO) Take 500 mg by mouth Daily.     • aspirin 81 MG EC tablet Take 81 mg by mouth Daily.     • ezetimibe (ZETIA) 10 MG tablet TAKE 1 TABLET BY MOUTH DAILY. 90 tablet 3   • glipizide (GLUCOTROL) 5 MG tablet Take 5 mg by mouth Daily.     • ibuprofen (ADVIL,MOTRIN) 800 MG tablet Take 800 mg by mouth Every 8 (Eight) Hours As Needed for Mild Pain .     • levothyroxine (SYNTHROID, LEVOTHROID) 50 MCG tablet Take 50 mcg by mouth Daily.     • metFORMIN (GLUCOPHAGE) 500 MG tablet Take 500 mg by mouth Daily.     • metoprolol tartrate (LOPRESSOR) 50 MG tablet Take 1 tablet by mouth 2 (Two) Times a Day. 60 tablet 11   • Multiple Vitamins-Minerals (ZINC PO) Take  by mouth Daily.     • nitroglycerin (NITROSTAT) 0.4 MG SL tablet Place 1 tablet under the tongue Every 5 (Five) Minutes As Needed for Chest Pain. With max of 3 doses within 15 minutes 25 tablet 2   • omeprazole (priLOSEC) 20 MG capsule Take 20 mg by mouth Daily.     • potassium chloride (MICRO-K) 10 MEQ CR capsule Take 10 mEq by mouth Daily As Needed.     • QUEtiapine (SEROquel) 300 MG tablet Take 300 mg by mouth Every Night.     • rosuvastatin (CRESTOR) 20 MG tablet Take 20 mg by mouth Daily.     • Semaglutide,0.25 or 0.5MG/DOS, (OZEMPIC) 2 MG/1.5ML solution pen-injector Inject 0.5 mg under the skin into the appropriate area as directed 1  (One) Time Per Week.     • SUMAtriptan (IMITREX) 20 MG/ACT nasal spray 1 spray into the nostril(s) as directed by provider Every 2 (Two) Hours As Needed.     • torsemide (DEMADEX) 20 MG tablet Daily As Needed.     • traZODone (DESYREL) 150 MG tablet Take 150 mg by mouth Every Night.     • vitamin B-12 (CYANOCOBALAMIN) 1000 MCG tablet Take 1,000 mcg by mouth Daily.       No current facility-administered medications on file prior to visit.       ALLERGIES  Hydrocodone    HISTORY  Past Medical History:   Diagnosis Date   • Atherosclerosis of left anterior descending (LAD) artery    • Cancer (HCC)     melanoma   • Diabetes mellitus (HCC)    • MARSHALL (dyspnea on exertion)    • Emphysema of lung (HCC)     per LDCT scan 4-   • Former smoker    • GERD (gastroesophageal reflux disease)    • Hyperlipidemia    • Hypothyroidism    • Iron deficiency anemia    • Migraines    • QUAN on CPAP     uses cpap   • Tachycardia        Social History     Socioeconomic History   • Marital status:    Tobacco Use   • Smoking status: Former Smoker     Types: Cigarettes   • Smokeless tobacco: Never Used   • Tobacco comment: quit 2.5 yrs. ago, using e-cigs.    Substance and Sexual Activity   • Alcohol use: Yes     Comment: occas. drink   • Drug use: Never       Family History   Problem Relation Age of Onset   • Diabetes Mother    • Hypertension Mother    • Coronary artery disease Mother    • Depression Father        Review of Systems   Constitutional: Positive for fatigue. Negative for chills and fever.   HENT: Positive for rhinorrhea. Negative for congestion and sore throat.    Respiratory: Positive for apnea (C PAP). Negative for chest tightness and shortness of breath.    Cardiovascular: Positive for palpitations (flutters every once in awhile). Negative for chest pain and leg swelling.   Gastrointestinal: Negative for constipation, diarrhea and nausea.   Musculoskeletal: Positive for arthralgias. Negative for back pain and neck  "pain.   Allergic/Immunologic: Negative for environmental allergies and food allergies.   Neurological: Positive for weakness. Negative for dizziness, syncope and light-headedness.   Hematological: Bruises/bleeds easily.   Psychiatric/Behavioral: Negative for sleep disturbance.       Objective     VITALS: /78 (BP Location: Left arm, Patient Position: Sitting)   Pulse 95   Ht 162.6 cm (64.02\")   Wt 95.4 kg (210 lb 6.4 oz)   SpO2 98%   BMI 36.10 kg/m²     LABS:   Lab Results (most recent)     None          IMAGING:   No Images in the past 120 days found..    EXAM:  Physical Exam    Procedure   Procedures       Assessment & Plan    Diagnosis Plan   1. Primary hypertension     2. Mixed hyperlipidemia     3. Atherosclerosis of left anterior descending (LAD) artery     4. Palpitations       Plan     1. The patient is still experiencing occasional palpitations. She declined a heart monitor at this time, but was advised if her symptoms persist or worsen to call the office to  a heart monitor.      2. Advised the patient that her heart rate variations and palpitation could be due to her uncontrolled diabetes. Advised her to continue to monitor her heart rate as her physician works to get her diabetes under control.      3. The patient is interested in weight loss surgery. Advised the patient to call the office if she needs cardiac clearance letter for surgery.  Based on the findings of patient's recent left heart catheterization feel that patient would be at acceptable risk with cautions with IV fluids.    4.  Patient's blood pressure is controlled on current blood pressure medication regimen.  No medication changes are warranted at this time.  Patient advised to monitor blood pressure on a daily basis and report any persistent highs or lows.  Set goal blood pressure for patient at 130/80 or below.    5.  Patient will continue Zetia and rosuvastatin for cholesterol management.    6.  Informed of signs and " symptoms of ACS and advised to seek emergent treatment for any new worsening symptoms.  Patient also advised sooner follow-up as needed.  Also advised to follow-up with family doctor as needed  This note is dictated utilizing voice recognition software.  Although this record has been proof read, transcriptional errors may still be present. If questions occur regarding the content of this record please do not hesitate to call our office.  I have reviewed and confirmed the accuracy of the ROS as documented by the MA/LPN/RN ALAN Hoskins         Assessment      1. Rapid heart rate      2. Uncontrolled type 2 diabetes     Return in about 6 months (around 2/25/2023), or if symptoms worsen or fail to improve.    Diagnoses and all orders for this visit:    1. Primary hypertension (Primary)    2. Mixed hyperlipidemia    3. Atherosclerosis of left anterior descending (LAD) artery    4. Palpitations        Can Chandler  reports that she has quit smoking. Her smoking use included cigarettes. She has never used smokeless tobacco.. I have educated her on the risk of diseases from using tobacco products.        MEDS ORDERED DURING VISIT:  No orders of the defined types were placed in this encounter.    .DAXSCRIBEANDPROVIDERSTATEMENT Lynette Barrera.      This document has been electronically signed by Brian Rivera Jr., ALAN  August 25, 2022 22:06 EDT

## 2022-09-06 DIAGNOSIS — E78.2 MIXED HYPERLIPIDEMIA: ICD-10-CM

## 2022-09-06 DIAGNOSIS — I25.10 ATHEROSCLEROSIS OF LEFT ANTERIOR DESCENDING (LAD) ARTERY: ICD-10-CM

## 2022-09-06 RX ORDER — EZETIMIBE 10 MG/1
10 TABLET ORAL DAILY
Qty: 30 TABLET | Refills: 3 | Status: SHIPPED | OUTPATIENT
Start: 2022-09-06 | End: 2023-02-22 | Stop reason: SDUPTHER

## 2022-09-06 NOTE — TELEPHONE ENCOUNTER
Name from pharmacy: EZETIMIBE 10 MG TABS 10 Tablet         Will file in chart as: ezetimibe (ZETIA) 10 MG tablet    Sig: TAKE 1 TABLET BY MOUTH DAILY.    Disp:  30 tablet    Refills:  3    Start: 9/6/2022    Class: Normal    Non-formulary For: Mixed hyperlipidemia, Atherosclerosis of left anterior descending (LAD) artery    Last ordered: 4 weeks ago by Kaz Sen MD Last refill: 8/8/2022    Rx #: [id number]    Intestinal Cholesterol Absorption Inhibitor Protocol Failed 09/06/2022 10:44 AM   Protocol Details  Lipid panel in past 12 months    AST or ALT in past 12 months    No active pregnancy on file    No positive pregnancy in past 12 months    Recent or future visit with prescriber (12MO/30D)

## 2022-12-28 DIAGNOSIS — R06.09 DOE (DYSPNEA ON EXERTION): ICD-10-CM

## 2022-12-28 DIAGNOSIS — R07.2 PRECORDIAL PAIN: ICD-10-CM

## 2022-12-28 DIAGNOSIS — I10 ESSENTIAL HYPERTENSION: ICD-10-CM

## 2022-12-28 DIAGNOSIS — R00.2 PALPITATIONS: ICD-10-CM

## 2022-12-28 RX ORDER — METOPROLOL TARTRATE 50 MG/1
TABLET, FILM COATED ORAL
Qty: 180 TABLET | Refills: 11 | Status: SHIPPED | OUTPATIENT
Start: 2022-12-28 | End: 2023-02-22 | Stop reason: SDUPTHER

## 2023-02-21 ENCOUNTER — TELEPHONE (OUTPATIENT)
Dept: CARDIOLOGY | Facility: CLINIC | Age: 63
End: 2023-02-21
Payer: COMMERCIAL

## 2023-02-21 NOTE — TELEPHONE ENCOUNTER
PT's appt was moved to June but will need refills sent in to her pharmacy. Please send refills on Amlodipine, metoprolol, ezetimibe. Thank you!

## 2023-02-22 DIAGNOSIS — R07.2 PRECORDIAL PAIN: ICD-10-CM

## 2023-02-22 DIAGNOSIS — R06.09 DOE (DYSPNEA ON EXERTION): ICD-10-CM

## 2023-02-22 DIAGNOSIS — E78.2 MIXED HYPERLIPIDEMIA: ICD-10-CM

## 2023-02-22 DIAGNOSIS — I10 ESSENTIAL HYPERTENSION: ICD-10-CM

## 2023-02-22 DIAGNOSIS — R00.2 PALPITATIONS: ICD-10-CM

## 2023-02-22 DIAGNOSIS — I25.10 ATHEROSCLEROSIS OF LEFT ANTERIOR DESCENDING (LAD) ARTERY: ICD-10-CM

## 2023-02-22 RX ORDER — METOPROLOL TARTRATE 50 MG/1
TABLET, FILM COATED ORAL
Qty: 180 TABLET | Refills: 11 | Status: SHIPPED | OUTPATIENT
Start: 2023-02-22

## 2023-02-22 RX ORDER — EZETIMIBE 10 MG/1
10 TABLET ORAL DAILY
Qty: 30 TABLET | Refills: 3 | Status: SHIPPED | OUTPATIENT
Start: 2023-02-22

## 2023-02-22 RX ORDER — AMLODIPINE BESYLATE 2.5 MG/1
2.5 TABLET ORAL DAILY
Qty: 30 TABLET | Refills: 11 | Status: SHIPPED | OUTPATIENT
Start: 2023-02-22

## 2023-05-19 ENCOUNTER — TELEPHONE (OUTPATIENT)
Dept: CARDIOLOGY | Facility: CLINIC | Age: 63
End: 2023-05-19
Payer: COMMERCIAL

## 2023-05-19 NOTE — TELEPHONE ENCOUNTER
Have her break your metoprolol in half to 25 mg daily.  Hopefully this will help maintain her blood pressure.  I am also concerned that her heart rate is running so tachycardic.  Please order a CBC CMP TSH and magnesium to look for other potential causes of her symptoms.  Probably should get her in for a nurse is not early next week

## 2023-05-19 NOTE — TELEPHONE ENCOUNTER
Pt LVM stating her BP has been running low: 62/48 is one of the lowest readings. Stated it makes her feel poorly when it runs that low.  Been holding Norvasc x1 week, sat PCP Wed and she agreed w/ holding.   Stated she is still taking Metoprolol 50mg BID.       Called pt and asked how BP is running w/o the Norvasc, she stated BP is running low still, but not as low. This am: 98/63.   She stated it's been running this low for a few weeks. Pt denies any med changes since last being seen, stated most recent change is Ozempic. She denied recent weight loss.   Stated she is still taking her Motoprolol as directed, HR was 117 last sun, 80 something this am.   Stated BP is lowest at night and that it makes her feel like she can hardly move.   Px: Professional

## 2023-05-19 NOTE — TELEPHONE ENCOUNTER
Called patient with recommendations. She had labs at PCP on Wednesday, called for results when available. She request to keep follow up as scheduled and call with any concerns.

## 2023-06-08 ENCOUNTER — OFFICE VISIT (OUTPATIENT)
Dept: CARDIOLOGY | Facility: CLINIC | Age: 63
End: 2023-06-08
Payer: COMMERCIAL

## 2023-06-08 VITALS
HEART RATE: 102 BPM | RESPIRATION RATE: 16 BRPM | WEIGHT: 205 LBS | BODY MASS INDEX: 35 KG/M2 | SYSTOLIC BLOOD PRESSURE: 112 MMHG | HEIGHT: 64 IN | OXYGEN SATURATION: 96 % | DIASTOLIC BLOOD PRESSURE: 81 MMHG

## 2023-06-08 DIAGNOSIS — R06.09 DOE (DYSPNEA ON EXERTION): ICD-10-CM

## 2023-06-08 DIAGNOSIS — I10 ESSENTIAL HYPERTENSION: ICD-10-CM

## 2023-06-08 DIAGNOSIS — E78.2 MIXED HYPERLIPIDEMIA: ICD-10-CM

## 2023-06-08 DIAGNOSIS — R06.02 SHORTNESS OF BREATH: ICD-10-CM

## 2023-06-08 DIAGNOSIS — I10 PRIMARY HYPERTENSION: Primary | ICD-10-CM

## 2023-06-08 DIAGNOSIS — I25.10 ATHEROSCLEROSIS OF LEFT ANTERIOR DESCENDING (LAD) ARTERY: ICD-10-CM

## 2023-06-08 DIAGNOSIS — R00.2 PALPITATIONS: ICD-10-CM

## 2023-06-08 DIAGNOSIS — R07.2 PRECORDIAL PAIN: ICD-10-CM

## 2023-06-08 PROCEDURE — 99214 OFFICE O/P EST MOD 30 MIN: CPT | Performed by: NURSE PRACTITIONER

## 2023-06-08 PROCEDURE — 93000 ELECTROCARDIOGRAM COMPLETE: CPT | Performed by: NURSE PRACTITIONER

## 2023-06-08 RX ORDER — ARIPIPRAZOLE 10 MG/1
10 TABLET ORAL DAILY
COMMUNITY
Start: 2023-05-15

## 2023-06-08 RX ORDER — EZETIMIBE 10 MG/1
10 TABLET ORAL DAILY
Qty: 90 TABLET | Refills: 3 | Status: SHIPPED | OUTPATIENT
Start: 2023-06-08

## 2023-06-08 RX ORDER — SEMAGLUTIDE 2.68 MG/ML
2 INJECTION, SOLUTION SUBCUTANEOUS WEEKLY
COMMUNITY
Start: 2023-05-15

## 2023-06-08 NOTE — PROGRESS NOTES
Subjective     Can Harvey is a 62 y.o. female who presents to day for Shortness of Breath (6 month f/u ).    CHIEF COMPLIANT  Chief Complaint   Patient presents with   • Shortness of Breath     6 month f/u        Active Problems:  Problem List Items Addressed This Visit        Cardiac and Vasculature    Mixed hyperlipidemia    Relevant Medications    ezetimibe (ZETIA) 10 MG tablet    Other Relevant Orders    ECG 12 Lead    Atherosclerosis of left anterior descending (LAD) artery    Relevant Medications    metoprolol tartrate (LOPRESSOR) 25 MG tablet    ezetimibe (ZETIA) 10 MG tablet    Other Relevant Orders    ECG 12 Lead    Palpitations    Relevant Medications    metoprolol tartrate (LOPRESSOR) 25 MG tablet    Other Relevant Orders    ECG 12 Lead       Pulmonary and Pneumonias    Shortness of breath    Relevant Orders    ECG 12 Lead   Other Visit Diagnoses     Primary hypertension    -  Primary    Relevant Medications    metoprolol tartrate (LOPRESSOR) 25 MG tablet    Other Relevant Orders    ECG 12 Lead    Precordial pain        Relevant Medications    metoprolol tartrate (LOPRESSOR) 25 MG tablet    Other Relevant Orders    ECG 12 Lead    Essential hypertension        Relevant Medications    metoprolol tartrate (LOPRESSOR) 25 MG tablet    Other Relevant Orders    ECG 12 Lead    MARSHALL (dyspnea on exertion)        Relevant Medications    metoprolol tartrate (LOPRESSOR) 25 MG tablet    Other Relevant Orders    ECG 12 Lead           PROBLEM LIST:      1. LAD, severe calcification per LDCT scan on 4-   1.1 left heart cath 4/22: Left main normal, circumflex normal, LAD minor irregularities, RCA normal, EF 55%, LVEDP 18-22  2. MARSHALL  3. Hyperlipidemia   4. QUAN, on CPAP. Followed By Dr. Zambrano  5. Migraines  6. Hypothyroidism  7. Former smoker  8. Iron Def. Anemia  9. Echo, 6-4-2021, EF 50%, grade 2 DD, trivial AI/MR/TR  10. Currently uses E-Cig. Quit smoking 3 yrs. ago  HPI  HPI    CAN HARVEY is a  "62-year-old female patient who is being seen today for follow-up for dyspnea and chronic arterial hypertension.      She went under a left heart catheterization in 04/2023 that showed normal coronary arteries, preserved ejection fraction of 55 percent, but an elevated LVEDP of 18 to 22 mmHg. She is followed by Dr. Zambrano for pulmonology. She maintains Dr. Sen prescribed her 40 mg of Crestor instead of 20 mg adds he put her on Zetia. She states she tolerates Zetia well.    Lab work from 2022 showed CBC, normal blood volume, normal glucose level at 106 mg/dL, normal BUN, normal creatinine, normal sodium, potassium, chloride, normal electrolytes, normal AST, normal ALT, normal liver enzymes, normal glomerular filtration rate at 92. Total cholesterol 143, triglycerides 93, HDL 42, LDL 82. TSH 0.99.    The patient reports her blood pressure is still low and on 06/06/2023 it was 127/73 mmHg, which is the highest it has been. She states her blood pressure has been in the low 100s to 90s mmHg systolic and adds Norvasc made her feel \"like a lymph noodle.\" She notes she held the Norvasc before she was instructed to see what it would do. The patient's heart rate is 77 bpm and she states her heart rate will occasionally be in the 100s beats per minute.    The patient states she is constantly fatigued. She confirms she does not wake up gasping for air and states she uses a CPAP machine. The patient notes she does not lay flat on her back.     She states she had a couple of episodes of bright red blood in her stool.  She said this is centered around her constipation    The patient states she has pain in her knees and shoulders and notes she receives injections in her knees.    She confirms she does not experience angina and states she had very mild angina when she first came as a patient. She adds if she goes up and down the stairs a couple of times in a row, she will experience dyspnea and states she is not severely " dyspneic.    She denies a need for any refills on her medications and adds she has lost approximately 25 pounds.    The patient reports she had a tick bite a couple of weeks ago and states she was placed on doxycycline. She maintains it still itches.    The patient states she has a screening CT scan scheduled for 06/09/2023.  Overall she feels that she is doing well from the cardiovascular standpoint.  We will continue to monitor.    PRIOR MEDS  Current Outpatient Medications on File Prior to Visit   Medication Sig Dispense Refill   • ARIPiprazole (ABILIFY) 10 MG tablet Take 1 tablet by mouth Daily.     • Ascorbic Acid (VITAMIN C PO) Take 500 mg by mouth Daily.     • aspirin 81 MG EC tablet Take 1 tablet by mouth Daily.     • ibuprofen (ADVIL,MOTRIN) 800 MG tablet Take 1 tablet by mouth Every 8 (Eight) Hours As Needed for Mild Pain.     • levothyroxine (SYNTHROID, LEVOTHROID) 50 MCG tablet Take 1 tablet by mouth Daily.     • nitroglycerin (NITROSTAT) 0.4 MG SL tablet Place 1 tablet under the tongue Every 5 (Five) Minutes As Needed for Chest Pain. With max of 3 doses within 15 minutes 25 tablet 2   • omeprazole (priLOSEC) 20 MG capsule Take 1 capsule by mouth Daily.     • Ozempic, 2 MG/DOSE, 8 MG/3ML solution pen-injector 2 mg 1 (One) Time Per Week.     • potassium chloride (MICRO-K) 10 MEQ CR capsule Take 1 capsule by mouth Daily As Needed.     • QUEtiapine (SEROquel) 300 MG tablet Take 1 tablet by mouth Every Night.     • rosuvastatin (CRESTOR) 20 MG tablet Take 1 tablet by mouth Daily.     • SUMAtriptan (IMITREX) 20 MG/ACT nasal spray 1 spray into the nostril(s) as directed by provider Every 2 (Two) Hours As Needed.     • torsemide (DEMADEX) 20 MG tablet Daily As Needed.     • traZODone (DESYREL) 150 MG tablet Take 1 tablet by mouth Every Night.     • vitamin B-12 (CYANOCOBALAMIN) 1000 MCG tablet Take 1 tablet by mouth Daily.     • VITAMIN D PO Take 2,000 Units by mouth Daily.     • glipizide (GLUCOTROL) 5 MG  tablet Take 5 mg by mouth Daily.     • metFORMIN (GLUCOPHAGE) 500 MG tablet Take 500 mg by mouth Daily.       No current facility-administered medications on file prior to visit.       ALLERGIES  Hydrocodone    HISTORY  Past Medical History:   Diagnosis Date   • Atherosclerosis of left anterior descending (LAD) artery    • Cancer     melanoma   • Diabetes mellitus    • MARSHALL (dyspnea on exertion)    • Emphysema of lung     per LDCT scan 4-   • Former smoker    • GERD (gastroesophageal reflux disease)    • Hyperlipidemia    • Hypertension 2022   • Hypothyroidism    • Iron deficiency anemia    • Migraines    • QUAN on CPAP     uses cpap   • Tachycardia        Social History     Socioeconomic History   • Marital status:    Tobacco Use   • Smoking status: Former     Packs/day: 1.00     Years: 35.00     Pack years: 35.00     Types: Cigarettes, Electronic Cigarette   • Smokeless tobacco: Never   • Tobacco comments:     quit 2.5 yrs. ago, using e-cigs.    Substance and Sexual Activity   • Alcohol use: Yes     Alcohol/week: 1.0 standard drink     Types: 1 Glasses of wine per week     Comment: occas. drink   • Drug use: Never   • Sexual activity: Yes     Partners: Male     Birth control/protection: Surgical, Post-menopausal       Family History   Problem Relation Age of Onset   • Diabetes Mother    • Hypertension Mother    • Coronary artery disease Mother    • Asthma Mother    • Heart disease Mother    • Depression Father        Review of Systems   Constitutional:  Positive for fatigue (tired all the time).   HENT:  Positive for rhinorrhea.    Eyes:  Positive for visual disturbance (glasses).   Respiratory:  Positive for apnea (quan) and shortness of breath (on exertion).    Cardiovascular:  Negative for chest pain, palpitations and leg swelling.   Gastrointestinal:  Positive for blood in stool (with constipation) and constipation.   Genitourinary:  Negative for difficulty urinating, dysuria and frequency.  "  Musculoskeletal:  Positive for arthralgias and joint swelling.   Skin:  Negative for rash and wound.   Neurological:  Positive for dizziness (when PB drops) and light-headedness (when BP drops). Negative for weakness and numbness.   Hematological:  Bruises/bleeds easily.   Psychiatric/Behavioral:  Negative for sleep disturbance.      Objective     VITALS: /81 (BP Location: Left arm, Patient Position: Sitting)   Pulse 102   Resp 16   Ht 162.6 cm (64.02\")   Wt 93 kg (205 lb)   SpO2 96%   BMI 35.17 kg/m²     LABS:   Lab Results (most recent)       None            IMAGING:   No Images in the past 120 days found..    EXAM:  Physical Exam  Vitals and nursing note reviewed.   Constitutional:       Appearance: She is well-developed.   HENT:      Head: Normocephalic.   Neck:      Thyroid: No thyroid mass.      Vascular: No carotid bruit or JVD.      Trachea: Trachea and phonation normal.   Cardiovascular:      Rate and Rhythm: Normal rate and regular rhythm.      Pulses:           Radial pulses are 2+ on the right side and 2+ on the left side.        Dorsalis pedis pulses are 2+ on the right side and 2+ on the left side.      Heart sounds: Normal heart sounds. No murmur heard.    No friction rub. No gallop.   Pulmonary:      Effort: Pulmonary effort is normal. No respiratory distress.      Breath sounds: Normal breath sounds. No wheezing or rales.   Musculoskeletal:         General: No swelling. Normal range of motion.      Cervical back: Neck supple.   Skin:     General: Skin is warm and moist.      Capillary Refill: Capillary refill takes less than 2 seconds.      Findings: No rash.   Neurological:      Mental Status: She is alert and oriented to person, place, and time.   Psychiatric:         Speech: Speech normal.         Behavior: Behavior normal.         Thought Content: Thought content normal.         Judgment: Judgment normal.       Procedure     ECG 12 Lead    Date/Time: 6/8/2023 1:25 PM  Performed by: " Brian Rivera APRN  Authorized by: Brian Rivera APRN   Comparison: compared with previous ECG from 4/19/2022  Similar to previous ECG  Rhythm: sinus arrhythmia  Rate: normal  BPM: 77  QRS axis: normal  Other findings: non-specific ST-T wave changes  Comments: QTc 407 ms  No acute changes               Assessment & Plan    Diagnosis Plan   1. Primary hypertension  ECG 12 Lead      2. Shortness of breath  ECG 12 Lead      3. Palpitations  ECG 12 Lead    metoprolol tartrate (LOPRESSOR) 25 MG tablet      4. Precordial pain  ECG 12 Lead    metoprolol tartrate (LOPRESSOR) 25 MG tablet      5. Essential hypertension  ECG 12 Lead    metoprolol tartrate (LOPRESSOR) 25 MG tablet      6. MARSHALL (dyspnea on exertion)  ECG 12 Lead    metoprolol tartrate (LOPRESSOR) 25 MG tablet      7. Mixed hyperlipidemia  ECG 12 Lead    ezetimibe (ZETIA) 10 MG tablet      8. Atherosclerosis of left anterior descending (LAD) artery  ECG 12 Lead    ezetimibe (ZETIA) 10 MG tablet      1. The patient's most recent cardiac testing results were discussed in full detail during this visit.  Overall she feels like she is doing well from the cardiovascular standpoint denies any angina anginal equivalent symptoms.  2. The patient's medication regimen was discussed in full detail with her and her prescriptions for Zetia and Lopressor were refilled today. The patient's blood pressure today is 112/81 mmHg with a heart rate of 102 bpm.  3. The patient's metoprolol was decreased to 25 mg.  4.  Patient will continue her Zetia and rosuvastatin for hyperlipidemia.  She will continue these without change.  5.  Patient's dyspnea on exertion is stable nonprogressive.  Mainly only occurs with activity such as going up steps a couple times.  We will continue to monitor.  6.  Informed of signs and symptoms of ACS and advised to seek emergent treatment for any new worsening symptoms.  Patient also advised sooner follow-up as needed.  Also advised to follow-up with  family doctor as needed  This note is dictated utilizing voice recognition software.  Although this record has been proof read, transcriptional errors may still be present. If questions occur regarding the content of this record please do not hesitate to call our office.  I have reviewed and confirmed the accuracy of the ROS as documented by the MA/LPN/RN ALAN Hoskins    Assessment  1. Chronic arterial hypertension  2. Dyspnea  3. Hyperlipidemia  4. Angina  5. Bruising      Return in about 6 months (around 12/8/2023), or if symptoms worsen or fail to improve.    Diagnoses and all orders for this visit:    1. Primary hypertension (Primary)  -     ECG 12 Lead    2. Shortness of breath  -     ECG 12 Lead    3. Palpitations  -     ECG 12 Lead  -     metoprolol tartrate (LOPRESSOR) 25 MG tablet; Take one tab po bid  Dispense: 90 tablet; Refill: 3    4. Precordial pain  -     ECG 12 Lead  -     metoprolol tartrate (LOPRESSOR) 25 MG tablet; Take one tab po bid  Dispense: 90 tablet; Refill: 3    5. Essential hypertension  -     ECG 12 Lead  -     metoprolol tartrate (LOPRESSOR) 25 MG tablet; Take one tab po bid  Dispense: 90 tablet; Refill: 3    6. MARSHALL (dyspnea on exertion)  -     ECG 12 Lead  -     metoprolol tartrate (LOPRESSOR) 25 MG tablet; Take one tab po bid  Dispense: 90 tablet; Refill: 3    7. Mixed hyperlipidemia  -     ECG 12 Lead  -     ezetimibe (ZETIA) 10 MG tablet; Take 1 tablet by mouth Daily.  Dispense: 90 tablet; Refill: 3    8. Atherosclerosis of left anterior descending (LAD) artery  -     ECG 12 Lead  -     ezetimibe (ZETIA) 10 MG tablet; Take 1 tablet by mouth Daily.  Dispense: 90 tablet; Refill: 3        Can Chandler  reports that she has quit smoking. Her smoking use included cigarettes and electronic cigarette. She has a 35.00 pack-year smoking history. She has never used smokeless tobacco..           MEDS ORDERED DURING VISIT:  New Medications Ordered This Visit   Medications   •  metoprolol tartrate (LOPRESSOR) 25 MG tablet     Sig: Take one tab po bid     Dispense:  90 tablet     Refill:  3   • ezetimibe (ZETIA) 10 MG tablet     Sig: Take 1 tablet by mouth Daily.     Dispense:  90 tablet     Refill:  3           This document has been electronically signed by ALAN Hoskins Jr.  June 12, 2023 00:23 EDT    Transcribed from ambient dictation for ALAN Hoskins by Eric Alvarenga.  06/08/23   14:48 EDT    Patient or patient representative verbalized consent to the visit recording.  I have personally performed the services described in this document as transcribed by the above individual, and it is both accurate and complete.

## 2023-12-13 ENCOUNTER — OFFICE VISIT (OUTPATIENT)
Dept: CARDIOLOGY | Facility: CLINIC | Age: 63
End: 2023-12-13
Payer: COMMERCIAL

## 2023-12-13 VITALS
DIASTOLIC BLOOD PRESSURE: 84 MMHG | HEART RATE: 61 BPM | BODY MASS INDEX: 35.78 KG/M2 | WEIGHT: 209.6 LBS | OXYGEN SATURATION: 99 % | SYSTOLIC BLOOD PRESSURE: 136 MMHG | HEIGHT: 64 IN

## 2023-12-13 DIAGNOSIS — R00.2 PALPITATIONS: ICD-10-CM

## 2023-12-13 DIAGNOSIS — E78.2 MIXED HYPERLIPIDEMIA: Primary | ICD-10-CM

## 2023-12-13 DIAGNOSIS — I25.10 ATHEROSCLEROSIS OF LEFT ANTERIOR DESCENDING (LAD) ARTERY: ICD-10-CM

## 2023-12-13 DIAGNOSIS — R06.09 DOE (DYSPNEA ON EXERTION): ICD-10-CM

## 2023-12-13 DIAGNOSIS — R07.2 PRECORDIAL PAIN: ICD-10-CM

## 2023-12-13 DIAGNOSIS — I10 ESSENTIAL HYPERTENSION: ICD-10-CM

## 2023-12-13 PROCEDURE — 99213 OFFICE O/P EST LOW 20 MIN: CPT | Performed by: NURSE PRACTITIONER

## 2023-12-13 RX ORDER — EZETIMIBE 10 MG/1
10 TABLET ORAL DAILY
Qty: 90 TABLET | Refills: 3 | Status: SHIPPED | OUTPATIENT
Start: 2023-12-13

## 2023-12-13 NOTE — PROGRESS NOTES
Subjective     Can Chandler is a 63 y.o. female who presents to day for 6 month follow up  and Hypertension.    CHIEF COMPLIANT  Chief Complaint   Patient presents with    6 month follow up     Hypertension       Active Problems:  Problem List Items Addressed This Visit          Cardiac and Vasculature    Mixed hyperlipidemia - Primary    Relevant Medications    ezetimibe (ZETIA) 10 MG tablet    Atherosclerosis of left anterior descending (LAD) artery    Relevant Medications    metoprolol tartrate (LOPRESSOR) 25 MG tablet    ezetimibe (ZETIA) 10 MG tablet    Palpitations    Relevant Medications    metoprolol tartrate (LOPRESSOR) 25 MG tablet     Other Visit Diagnoses       Precordial pain        Relevant Medications    metoprolol tartrate (LOPRESSOR) 25 MG tablet    Essential hypertension        Relevant Medications    metoprolol tartrate (LOPRESSOR) 25 MG tablet    MARSHALL (dyspnea on exertion)        Relevant Medications    metoprolol tartrate (LOPRESSOR) 25 MG tablet              PROBLEM LIST:      1. LAD, severe calcification per LDCT scan on 4-   1.1 left heart cath 4/22: Left main normal, circumflex normal, LAD minor irregularities, RCA normal, EF 55%, LVEDP 18-22  2. MARSHALL  3. Hyperlipidemia   4. QUAN, on CPAP. Followed By Dr. Zambrano  5. Migraines  6. Hypothyroidism  7. Former smoker  8. Iron Def. Anemia  9. Echo, 6-4-2021, EF 50%, grade 2 DD, trivial AI/MR/TR  10. Currently uses E-Cig. Quit smoking 3 yrs. ago    HPI  HPI  Ms. Tonia Chandler is a 63-year-old female patient who is being followed up today for chronic arterial hypertension.  Patient does have a history of chronic arterial hypertension and which she is currently being treated with metoprolol.  She did provide a log that indicated that when she was on the 50 mg of metoprolol she is running hypotensive and the dose was cut back to 25 mg.  She has been doing relatively well since.  The blood pressure log will be scanned into the chart for  further reference if needed.    Overall patient says that she is doing relatively well from the cardiovascular standpoint.  She denies any angina anginal equivalent symptoms at this time.  Patient denies any chest pain, shortness of breath, palpitations, lower extremity edema, fatigue, syncope, PND, orthopnea, or strokelike symptoms.  She is on aspirin for antiplatelet therapy rosuvastatin and Zetia for statin therapy.    Patient does report that she has been under a tremendous amount of stress as of late due to the fact that her best friend has recently passed away.  She is also has the stressors of moving to [place] to be with her children and the detention of her .  PRIOR MEDS  Current Outpatient Medications on File Prior to Visit   Medication Sig Dispense Refill    ARIPiprazole (ABILIFY) 10 MG tablet Take 1 tablet by mouth Daily.      Ascorbic Acid (VITAMIN C PO) Take 500 mg by mouth Daily.      aspirin 81 MG EC tablet Take 1 tablet by mouth Daily.      ibuprofen (ADVIL,MOTRIN) 800 MG tablet Take 1 tablet by mouth Every 8 (Eight) Hours As Needed for Mild Pain.      levothyroxine (SYNTHROID, LEVOTHROID) 50 MCG tablet Take 1 tablet by mouth Daily.      nitroglycerin (NITROSTAT) 0.4 MG SL tablet Place 1 tablet under the tongue Every 5 (Five) Minutes As Needed for Chest Pain. With max of 3 doses within 15 minutes 25 tablet 2    omeprazole (priLOSEC) 20 MG capsule Take 1 capsule by mouth Daily.      potassium chloride (MICRO-K) 10 MEQ CR capsule Take 1 capsule by mouth Daily As Needed.      QUEtiapine (SEROquel) 300 MG tablet Take 1 tablet by mouth Every Night.      SUMAtriptan (IMITREX) 20 MG/ACT nasal spray 1 spray into the nostril(s) as directed by provider Every 2 (Two) Hours As Needed.      Tirzepatide (Mounjaro) 7.5 MG/0.5ML solution pen-injector Inject  under the skin into the appropriate area as directed.      torsemide (DEMADEX) 20 MG tablet Daily As Needed.      traZODone (DESYREL) 150 MG tablet  Take 1 tablet by mouth Every Night.      vitamin B-12 (CYANOCOBALAMIN) 1000 MCG tablet Take 1 tablet by mouth Daily.      VITAMIN D PO Take 2,000 Units by mouth Daily.      glipizide (GLUCOTROL) 5 MG tablet Take 5 mg by mouth Daily.      metFORMIN (GLUCOPHAGE) 500 MG tablet Take 500 mg by mouth Daily.      Ozempic, 2 MG/DOSE, 8 MG/3ML solution pen-injector 2 mg 1 (One) Time Per Week.      rosuvastatin (CRESTOR) 40 MG tablet Take 1 tablet by mouth Daily.       No current facility-administered medications on file prior to visit.       ALLERGIES  Hydrocodone    HISTORY  Past Medical History:   Diagnosis Date    Atherosclerosis of left anterior descending (LAD) artery     Cancer     melanoma    Diabetes mellitus     MARSHALL (dyspnea on exertion)     Emphysema of lung     per LDCT scan 4-    Former smoker     GERD (gastroesophageal reflux disease)     Hyperlipidemia     Hypertension 2022    Hypothyroidism     Iron deficiency anemia     Migraines     QUAN on CPAP     uses cpap    Tachycardia        Social History     Socioeconomic History    Marital status:    Tobacco Use    Smoking status: Former     Packs/day: 1.00     Years: 35.00     Additional pack years: 0.00     Total pack years: 35.00     Types: Cigarettes, Electronic Cigarette    Smokeless tobacco: Never    Tobacco comments:     quit 2.5 yrs. ago, using e-cigs.    Substance and Sexual Activity    Alcohol use: Yes     Alcohol/week: 1.0 standard drink of alcohol     Types: 1 Glasses of wine per week     Comment: occas. drink    Drug use: Never    Sexual activity: Yes     Partners: Male     Birth control/protection: Surgical, Post-menopausal       Family History   Problem Relation Age of Onset    Diabetes Mother     Hypertension Mother     Coronary artery disease Mother     Asthma Mother     Heart disease Mother     Depression Father        Review of Systems   Constitutional:  Negative for chills, fatigue and fever.   HENT:  Negative for congestion,  "rhinorrhea and sore throat.    Eyes:  Negative for visual disturbance.   Respiratory:  Positive for apnea (C PAP). Negative for chest tightness and shortness of breath.    Cardiovascular:  Negative for chest pain, palpitations and leg swelling.   Gastrointestinal:  Positive for constipation. Negative for diarrhea and nausea.   Musculoskeletal:  Positive for arthralgias (knees and shoulders) and back pain (lower back). Negative for neck pain.   Allergic/Immunologic: Negative for environmental allergies and food allergies.   Neurological:  Positive for dizziness (not often in evening getting up from bed), weakness (only if blood pressure gets low) and light-headedness. Negative for syncope.   Hematological:  Bruises/bleeds easily.   Psychiatric/Behavioral:  Negative for sleep disturbance.        Objective     VITALS: /84 (BP Location: Left arm, Patient Position: Sitting, Cuff Size: Adult)   Pulse 61   Ht 162.6 cm (64.02\")   Wt 95.1 kg (209 lb 9.6 oz)   SpO2 99%   BMI 35.96 kg/m²     LABS:   Lab Results (most recent)       None            IMAGING:   No Images in the past 120 days found..    EXAM:  Physical Exam  Vitals and nursing note reviewed.   Constitutional:       Appearance: She is well-developed.   HENT:      Head: Normocephalic.   Neck:      Thyroid: No thyroid mass.      Vascular: No carotid bruit or JVD.      Trachea: Trachea and phonation normal.   Cardiovascular:      Rate and Rhythm: Normal rate and regular rhythm.      Pulses:           Radial pulses are 2+ on the right side and 2+ on the left side.        Posterior tibial pulses are 2+ on the right side and 2+ on the left side.      Heart sounds: Normal heart sounds. No murmur heard.     No friction rub. No gallop.   Pulmonary:      Effort: Pulmonary effort is normal. No respiratory distress.      Breath sounds: Normal breath sounds. No wheezing or rales.   Musculoskeletal:         General: No swelling. Normal range of motion.      Cervical " back: Neck supple.   Skin:     General: Skin is warm and dry.      Capillary Refill: Capillary refill takes less than 2 seconds.      Findings: No rash.   Neurological:      Mental Status: She is alert and oriented to person, place, and time.   Psychiatric:         Speech: Speech normal.         Behavior: Behavior normal.         Thought Content: Thought content normal.         Judgment: Judgment normal.         Procedure   Procedures       Assessment & Plan    Diagnosis Plan   1. Mixed hyperlipidemia  ezetimibe (ZETIA) 10 MG tablet      2. Palpitations  metoprolol tartrate (LOPRESSOR) 25 MG tablet      3. Precordial pain  metoprolol tartrate (LOPRESSOR) 25 MG tablet      4. Essential hypertension  metoprolol tartrate (LOPRESSOR) 25 MG tablet      5. MARSHALL (dyspnea on exertion)  metoprolol tartrate (LOPRESSOR) 25 MG tablet      6. Atherosclerosis of left anterior descending (LAD) artery  ezetimibe (ZETIA) 10 MG tablet      1.  Patient does have a history of hyperlipidemia as well as atherosclerosis of the left anterior descending.  She is on rosuvastatin and Zetia and which has her LDL cholesterol at 82.  Her HDL was 42, and triglycerides of 93.  She will continue her current medication regimen without change.  2.  Patient's blood pressure is controlled on current blood pressure medication regimen.  No medication changes are warranted at this time.  Patient advised to monitor blood pressure on a daily basis and report any persistent highs or lows.  Set goal blood pressure for patient at 130/80 or below.  3.  Overall patient feels like she is doing well from the cardiovascular standpoint.  She denies any angina anginal equivalent symptoms.  Will continue to follow.  4.  Patient does report having a colonoscopy and noticed that her heart rate dropped to 48 and was irregular.  They continued with the colonoscopy and she did fine.  She says that she has not noticed her heart rate below 60 at home.  We did discuss further  titration down of the metoprolol.  However she wishes to continue to monitor heart rate and blood pressure at home due to the fact that she is asymptomatic and rarely has any dizziness.  Her heart monitor in which if she starts having issues she will notify us and we will move forth at that time.  5.  Informed of signs and symptoms of ACS and advised to seek emergent treatment for any new worsening symptoms.  Patient also advised sooner follow-up as needed.  Also advised to follow-up with family doctor as needed  This note is dictated utilizing voice recognition software.  Although this record has been proof read, transcriptional errors may still be present. If questions occur regarding the content of this record please do not hesitate to call our office.  I have reviewed and confirmed the accuracy of the ROS as documented by the MA/MURIELN/RN ALAN Hoskins    Return if symptoms worsen or fail to improve, for Next scheduled follow up.    Diagnoses and all orders for this visit:    1. Mixed hyperlipidemia (Primary)  -     ezetimibe (ZETIA) 10 MG tablet; Take 1 tablet by mouth Daily.  Dispense: 90 tablet; Refill: 3    2. Palpitations  -     metoprolol tartrate (LOPRESSOR) 25 MG tablet; Take one tab po bid  Dispense: 180 tablet; Refill: 3    3. Precordial pain  -     metoprolol tartrate (LOPRESSOR) 25 MG tablet; Take one tab po bid  Dispense: 180 tablet; Refill: 3    4. Essential hypertension  -     metoprolol tartrate (LOPRESSOR) 25 MG tablet; Take one tab po bid  Dispense: 180 tablet; Refill: 3    5. MARSHALL (dyspnea on exertion)  -     metoprolol tartrate (LOPRESSOR) 25 MG tablet; Take one tab po bid  Dispense: 180 tablet; Refill: 3    6. Atherosclerosis of left anterior descending (LAD) artery  -     ezetimibe (ZETIA) 10 MG tablet; Take 1 tablet by mouth Daily.  Dispense: 90 tablet; Refill: 3        Can Chandler  reports that she has quit smoking. Her smoking use included cigarettes and electronic cigarette.  She has a 35.00 pack-year smoking history. She has never used smokeless tobacco.. I have educated her on the risk of diseases from using tobacco products.     Advance Care Planning   ACP discussion was held with the patient during this visit. Patient has an advance directive (not in EMR), copy requested.                           MEDS ORDERED DURING VISIT:  New Medications Ordered This Visit   Medications    metoprolol tartrate (LOPRESSOR) 25 MG tablet     Sig: Take one tab po bid     Dispense:  180 tablet     Refill:  3    ezetimibe (ZETIA) 10 MG tablet     Sig: Take 1 tablet by mouth Daily.     Dispense:  90 tablet     Refill:  3           This document has been electronically signed by Brian Rivera Jr., APRN  December 14, 2023 08:59 EST

## 2024-01-19 ENCOUNTER — TELEPHONE (OUTPATIENT)
Dept: CARDIOLOGY | Facility: CLINIC | Age: 64
End: 2024-01-19
Payer: COMMERCIAL

## 2024-01-19 NOTE — TELEPHONE ENCOUNTER
Pt LVM stating she had a bilateral breast MRI and that there was an incidental findings of an inter-atrial lipoma. Stated she was told to contact us to address it.  #[phone]        I called pt, she stated she had imaging done w/ [facility] in [place], ordered by Elis Paez w/ [facility]. Stated that Elis is no longer w/ [facility] and so she doesn't have the report. I informed pt that we do close early today, but that I would work on obtaining report for JR to review, stated I would call back if we haven't received anything by mid-day Monday. She verbalized understanding.       Called [facility], requested report ASAP. They stated they will  get a message sent to their team, but that they are short staffed due to weather and may be some delay.

## 2024-01-22 NOTE — TELEPHONE ENCOUNTER
Called [facility] back to check on status of medical records, they stated that we can't get records from Elis Paez because she is no longer there ans would have to get it from her new PCP. Pt's new PCP is not at [facility] so they are not able to release the MRI report. Called pt, she has a copy of MRI report and will be bringing is in the morning of 01/23/2024.

## 2024-01-23 ENCOUNTER — TELEPHONE (OUTPATIENT)
Dept: CARDIOLOGY | Facility: CLINIC | Age: 64
End: 2024-01-23
Payer: COMMERCIAL

## 2024-01-23 DIAGNOSIS — I25.10 ATHEROSCLEROSIS OF LEFT ANTERIOR DESCENDING (LAD) ARTERY: ICD-10-CM

## 2024-01-23 DIAGNOSIS — E78.2 MIXED HYPERLIPIDEMIA: ICD-10-CM

## 2024-01-23 DIAGNOSIS — R06.09 DOE (DYSPNEA ON EXERTION): ICD-10-CM

## 2024-01-23 DIAGNOSIS — I51.89 CARDIAC MASS: Primary | ICD-10-CM

## 2024-01-23 DIAGNOSIS — D17.79 LIPOMA OF OTHER SPECIFIED SITES: Primary | ICD-10-CM

## 2024-01-23 DIAGNOSIS — R00.2 PALPITATIONS: ICD-10-CM

## 2024-01-23 DIAGNOSIS — R06.02 SHORTNESS OF BREATH: ICD-10-CM

## 2024-01-23 DIAGNOSIS — I10 PRIMARY HYPERTENSION: ICD-10-CM

## 2024-01-23 NOTE — TELEPHONE ENCOUNTER
I called patient and went over recommendations after reviewing MRI on breast. Both  and Dr Sen feel patient needs to have Echo and Cardiac MRI. Patient is agreeable to having cardiac MRI at . I will put both orders in .

## 2024-01-24 NOTE — TELEPHONE ENCOUNTER
I cannot get an Auth for the MRI- it is asking if a TTE had been performed first with the suspected mass. Will put on [facility] URGENT READ list, since it is scheduled for tomorrow 01/25/24.     Alexandria Larios, RegSched Rep

## 2024-01-25 ENCOUNTER — HOSPITAL ENCOUNTER (OUTPATIENT)
Dept: CARDIOLOGY | Facility: HOSPITAL | Age: 64
Discharge: HOME | End: 2024-01-25
Admitting: NURSE PRACTITIONER
Payer: COMMERCIAL

## 2024-01-25 VITALS — HEIGHT: 64 IN | WEIGHT: 209.66 LBS | BODY MASS INDEX: 35.79 KG/M2

## 2024-01-25 DIAGNOSIS — R00.2 PALPITATIONS: ICD-10-CM

## 2024-01-25 DIAGNOSIS — R06.02 SHORTNESS OF BREATH: ICD-10-CM

## 2024-01-25 DIAGNOSIS — D17.79 LIPOMA OF OTHER SPECIFIED SITES: ICD-10-CM

## 2024-01-25 LAB
AORTIC DIMENSIONLESS INDEX: 0.74 (DI)
BH CV ECHO MEAS - ACS: 1.42 CM
BH CV ECHO MEAS - AO MAX PG: 10.5 MMHG
BH CV ECHO MEAS - AO MEAN PG: 5.6 MMHG
BH CV ECHO MEAS - AO ROOT DIAM: 3.1 CM
BH CV ECHO MEAS - AO V2 MAX: 162.3 CM/SEC
BH CV ECHO MEAS - AO V2 VTI: 36.2 CM
BH CV ECHO MEAS - EDV(CUBED): 139.5 ML
BH CV ECHO MEAS - ESV(CUBED): 30.3 ML
BH CV ECHO MEAS - FS: 39.9 %
BH CV ECHO MEAS - IVS/LVPW: 1.01 CM
BH CV ECHO MEAS - IVSD: 1.08 CM
BH CV ECHO MEAS - LA DIMENSION: 3.9 CM
BH CV ECHO MEAS - LAT PEAK E' VEL: 7.7 CM/SEC
BH CV ECHO MEAS - LV MASS(C)D: 212.5 GRAMS
BH CV ECHO MEAS - LV MAX PG: 5.7 MMHG
BH CV ECHO MEAS - LV MEAN PG: 2.6 MMHG
BH CV ECHO MEAS - LV V1 MAX: 119.8 CM/SEC
BH CV ECHO MEAS - LV V1 VTI: 26.7 CM
BH CV ECHO MEAS - LVIDD: 5.2 CM
BH CV ECHO MEAS - LVIDS: 3.1 CM
BH CV ECHO MEAS - LVPWD: 1.07 CM
BH CV ECHO MEAS - MED PEAK E' VEL: 5.9 CM/SEC
BH CV ECHO MEAS - MV A MAX VEL: 44.5 CM/SEC
BH CV ECHO MEAS - MV DEC SLOPE: 362.1 CM/SEC2
BH CV ECHO MEAS - MV DEC TIME: 0.22 SEC
BH CV ECHO MEAS - MV E MAX VEL: 71 CM/SEC
BH CV ECHO MEAS - MV E/A: 1.59
BH CV ECHO MEAS - MV MAX PG: 3.4 MMHG
BH CV ECHO MEAS - MV MEAN PG: 1.15 MMHG
BH CV ECHO MEAS - MV P1/2T: 73.4 MSEC
BH CV ECHO MEAS - MV V2 VTI: 37.2 CM
BH CV ECHO MEAS - MVA(P1/2T): 3 CM2
BH CV ECHO MEAS - PA V2 MAX: 109.2 CM/SEC
BH CV ECHO MEAS - RV MAX PG: 2.7 MMHG
BH CV ECHO MEAS - RV V1 MAX: 81.7 CM/SEC
BH CV ECHO MEAS - RV V1 VTI: 22.5 CM
BH CV ECHO MEAS - RVDD: 3.6 CM
BH CV ECHO MEAS - TAPSE (>1.6): 2.15 CM
BH CV ECHO MEASUREMENTS AVERAGE E/E' RATIO: 10.44
BH CV XLRA - TDI S': 8.7 CM/SEC
SINUS: 3.3 CM

## 2024-01-25 PROCEDURE — 93306 TTE W/DOPPLER COMPLETE: CPT

## 2024-01-25 NOTE — TELEPHONE ENCOUNTER
I spoke with Dr. Sen and he recommended the MRI.  If we have to do the [facility] first we can get it done hopefully at the hospital.

## 2024-01-29 ENCOUNTER — TELEPHONE (OUTPATIENT)
Dept: CARDIOLOGY | Facility: CLINIC | Age: 64
End: 2024-01-29
Payer: COMMERCIAL

## 2024-01-29 NOTE — TELEPHONE ENCOUNTER
Patient called for status and date of emergent cardiac MRI to be done at . She spoke to [facility] regarding scheduling on 1/26/24 and she explained it was to be done at .   She is concerned that this was not sent to  to be scheduled. Explained referral shows faxed to  on 1/26/24 and I will route to referral specialist for any new information.

## 2024-01-30 ENCOUNTER — TELEPHONE (OUTPATIENT)
Dept: CARDIOLOGY | Facility: CLINIC | Age: 64
End: 2024-01-30
Payer: COMMERCIAL

## 2024-01-30 NOTE — TELEPHONE ENCOUNTER
I called patient and went over Echo results in detail:  Patient was seen to have an echogenic shadow seen in the right atrium measuring 4.4 x 3.4 cm.  Patient has been scheduled for MRI.  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  Mass measures 2.9 cm in diameter and is fixed.    Patient looks like she is scheduled for MRI on February 29, 2024.    Otherwise she had an ejection fraction of 66 to 70%.  The left ventricle was borderline dilated with grade 1 diastolic dysfunction.

## 2024-02-29 ENCOUNTER — HOSPITAL ENCOUNTER (OUTPATIENT)
Dept: MRI IMAGING | Facility: HOSPITAL | Age: 64
Discharge: HOME | End: 2024-02-29
Admitting: NURSE PRACTITIONER
Payer: COMMERCIAL

## 2024-02-29 ENCOUNTER — OUTSIDE FACILITY SERVICE (OUTPATIENT)
Dept: CARDIOLOGY | Facility: CLINIC | Age: 64
End: 2024-02-29
Payer: COMMERCIAL

## 2024-02-29 PROCEDURE — 82565 ASSAY OF CREATININE: CPT

## 2024-02-29 PROCEDURE — 0 GADOBENATE DIMEGLUMINE 529 MG/ML SOLUTION: Performed by: NURSE PRACTITIONER

## 2024-02-29 PROCEDURE — A9577 INJ MULTIHANCE: HCPCS | Performed by: NURSE PRACTITIONER

## 2024-02-29 PROCEDURE — 75561 CARDIAC MRI FOR MORPH W/DYE: CPT

## 2024-02-29 RX ADMIN — GADOBENATE DIMEGLUMINE 20 ML: 529 INJECTION, SOLUTION INTRAVENOUS at 14:18

## 2024-02-29 RX ADMIN — GADOBENATE DIMEGLUMINE 9 ML: 529 INJECTION, SOLUTION INTRAVENOUS at 14:18

## 2024-03-02 LAB — CREAT BLDA-MCNC: 0.8 MG/DL (ref 0.6–1.3)

## 2024-03-05 ENCOUNTER — TELEPHONE (OUTPATIENT)
Dept: CARDIOLOGY | Facility: CLINIC | Age: 64
End: 2024-03-05
Payer: COMMERCIAL

## 2024-03-05 NOTE — TELEPHONE ENCOUNTER
LAB  Pt notified of no acute findings. Provider will discuss results at f/u. Pt reminded of appt date and time.  ----- Message from Marly Renee MA sent at 3/5/2024  1:52 PM EST -----    ----- Message -----  From: Brian Rivera APRN  Sent: 3/5/2024   1:15 PM EST  To: Marly Renee MA    Normal creatinine

## 2024-03-06 ENCOUNTER — TELEPHONE (OUTPATIENT)
Dept: CARDIOLOGY | Facility: CLINIC | Age: 64
End: 2024-03-06
Payer: COMMERCIAL

## 2024-03-06 NOTE — TELEPHONE ENCOUNTER
I called patient and went over Cardiac MRI results:    The mass that was previously identified was noted to be an adiposeTissue.  No further intervention is required at this time.

## 2024-03-25 DIAGNOSIS — E78.2 MIXED HYPERLIPIDEMIA: ICD-10-CM

## 2024-03-25 DIAGNOSIS — I25.10 ATHEROSCLEROSIS OF LEFT ANTERIOR DESCENDING (LAD) ARTERY: ICD-10-CM

## 2024-03-26 RX ORDER — EZETIMIBE 10 MG/1
10 TABLET ORAL DAILY
Qty: 90 TABLET | Refills: 3 | Status: SHIPPED | OUTPATIENT
Start: 2024-03-26